# Patient Record
Sex: FEMALE | Race: BLACK OR AFRICAN AMERICAN | NOT HISPANIC OR LATINO | Employment: OTHER | URBAN - METROPOLITAN AREA
[De-identification: names, ages, dates, MRNs, and addresses within clinical notes are randomized per-mention and may not be internally consistent; named-entity substitution may affect disease eponyms.]

---

## 2018-02-27 ENCOUNTER — HOSPITAL ENCOUNTER (OUTPATIENT)
Dept: CT IMAGING | Facility: HOSPITAL | Age: 78
Discharge: HOME | End: 2018-02-27
Attending: FAMILY MEDICINE | Admitting: FAMILY MEDICINE

## 2018-02-27 ENCOUNTER — TRANSCRIBE ORDERS (OUTPATIENT)
Dept: ADMINISTRATIVE | Facility: HOSPITAL | Age: 78
End: 2018-02-27

## 2018-02-27 DIAGNOSIS — R04.2 HEMOPTYSIS: ICD-10-CM

## 2018-02-27 DIAGNOSIS — R04.2 HEMOPTYSIS: Primary | ICD-10-CM

## 2018-02-27 LAB — CREAT BLDA-MCNC: 0.8 MG/DL (ref 0.6–1.3)

## 2018-02-27 PROCEDURE — 82565 ASSAY OF CREATININE: CPT

## 2018-02-27 PROCEDURE — 71270 CT THORAX DX C-/C+: CPT

## 2018-02-27 PROCEDURE — 0 IOPAMIDOL 61 % SOLUTION: Performed by: FAMILY MEDICINE

## 2018-02-27 RX ADMIN — IOPAMIDOL 93 ML: 612 INJECTION, SOLUTION INTRAVENOUS at 14:30

## 2018-03-06 ENCOUNTER — OFFICE VISIT (OUTPATIENT)
Dept: PULMONOLOGY | Facility: CLINIC | Age: 78
End: 2018-03-06

## 2018-03-06 ENCOUNTER — TRANSCRIBE ORDERS (OUTPATIENT)
Dept: ADMINISTRATIVE | Facility: HOSPITAL | Age: 78
End: 2018-03-06

## 2018-03-06 ENCOUNTER — DOCUMENTATION (OUTPATIENT)
Dept: OTHER | Facility: HOSPITAL | Age: 78
End: 2018-03-06

## 2018-03-06 VITALS
HEART RATE: 87 BPM | BODY MASS INDEX: 32.57 KG/M2 | WEIGHT: 177 LBS | HEIGHT: 62 IN | RESPIRATION RATE: 18 BRPM | SYSTOLIC BLOOD PRESSURE: 122 MMHG | TEMPERATURE: 97.1 F | OXYGEN SATURATION: 95 % | DIASTOLIC BLOOD PRESSURE: 70 MMHG

## 2018-03-06 DIAGNOSIS — J47.1 BRONCHIECTASIS WITH ACUTE EXACERBATION (HCC): Primary | ICD-10-CM

## 2018-03-06 DIAGNOSIS — Z12.31 VISIT FOR SCREENING MAMMOGRAM: Primary | ICD-10-CM

## 2018-03-06 DIAGNOSIS — R91.1 LUNG NODULE: ICD-10-CM

## 2018-03-06 PROCEDURE — 99204 OFFICE O/P NEW MOD 45 MIN: CPT | Performed by: INTERNAL MEDICINE

## 2018-03-06 RX ORDER — ALBUTEROL SULFATE 90 UG/1
POWDER, METERED RESPIRATORY (INHALATION) AS NEEDED
Refills: 0 | COMMUNITY
Start: 2018-02-27

## 2018-03-06 RX ORDER — ASPIRIN 81 MG/1
81 TABLET ORAL DAILY
COMMUNITY

## 2018-03-06 RX ORDER — AZITHROMYCIN 250 MG/1
TABLET, FILM COATED ORAL
Refills: 0 | COMMUNITY
Start: 2018-03-01

## 2018-03-06 RX ORDER — HYDROCODONE BITARTRATE AND ACETAMINOPHEN 10; 325 MG/1; MG/1
TABLET ORAL AS NEEDED
Refills: 0 | COMMUNITY
Start: 2018-01-20

## 2018-03-06 RX ORDER — FOLIC ACID 1 MG/1
1 TABLET ORAL DAILY
COMMUNITY

## 2018-03-06 RX ORDER — METHYLPREDNISOLONE 4 MG/1
TABLET ORAL
Refills: 0 | COMMUNITY
Start: 2018-02-27 | End: 2018-06-29

## 2018-03-06 RX ORDER — OMEPRAZOLE 20 MG/1
20 CAPSULE, DELAYED RELEASE ORAL DAILY
COMMUNITY

## 2018-03-06 RX ORDER — BECLOMETHASONE DIPROPIONATE HFA 40 UG/1
AEROSOL, METERED RESPIRATORY (INHALATION) 2 TIMES DAILY
Refills: 0 | COMMUNITY
Start: 2018-02-27

## 2018-03-06 NOTE — PROGRESS NOTES
Met patient and family member in lung nodule clinic with . Patient recently had bronchitis and the flu. She was started on steroids, ABX and inhalers at that time and reports improvement. CT chest was ordered revealing 10 x 5mm RUL nodule. Scans and PFT's reviewed. Per  repeat CT chest x3mo with f/u OV. Introduced lung navigator role and provided contact information. She v/u and agreeable to plan. She knows to call with questions or concerns.

## 2018-03-06 NOTE — PROGRESS NOTES
"Sorts Subjective:     Chief Complaint:   Chief Complaint   Patient presents with   • Lung Nodule       HPI:    Katherine Marroquin is a 77 y.o. female seen in consultation at the request of Dr. Dank Farah for evaluation of a pulmonary nodule.    She developed symptoms of the \"flu\" about 3 weeks ago.  She had cough, congestion, and malaise.  She has been given antibiotics and her symptoms have improved though have not resolved.  She denies any systemic fevers currently.  She has no GI symptoms.    She describes her sputum as white but previously it was green.    She describes frequent episodes of bronchitis, usually every winter.    She saw Dr. Farah.  She was given an albuterol inhaler and nebulizer.  She was also given a Qvar inhaler.    A CT scan was performed.  This reveals bronchiectasis in the lower lobes.  There is bronchial wall thickening diffusely in the right upper lobe consistent with bronchial inflammation.  There was a described 10 x 5 mm ill-defined opacity associated with the bronchial wall likely inflammatory.  She is referred for evaluation of this abnormality.        Current medications are:   Current Outpatient Prescriptions:   •  aspirin 81 MG EC tablet, Take 81 mg by mouth Daily., Disp: , Rfl:   •  azithromycin (ZITHROMAX) 250 MG tablet, , Disp: , Rfl: 0  •  folic acid (FOLVITE) 1 MG tablet, Take 1 mg by mouth Daily., Disp: , Rfl:   •  HYDROcodone-acetaminophen (NORCO)  MG per tablet, As Needed., Disp: , Rfl: 0  •  MethylPREDNISolone (MEDROL, MARIA GUADALUPE,) 4 MG tablet, TK PO UTD, Disp: , Rfl: 0  •  omeprazole (priLOSEC) 20 MG capsule, Take 20 mg by mouth Daily., Disp: , Rfl:   •  PROAIR RESPICLICK 108 (90 Base) MCG/ACT inhaler, As Needed., Disp: , Rfl: 0  •  QVAR REDIHALER 40 MCG/ACT inhaler, 2 (Two) Times a Day., Disp: , Rfl: 0.      The patient's relevant past medical, surgical, family and social history were reviewed and updated in Epic as appropriate.     ROS:    Review of Systems  ROS " documented in patient questionnaire ×12 systems.  Reviewed with patient.  Otherwise negative except as noted in HPI.    Objective:    Physical Exam   Constitutional: She is oriented to person, place, and time. She appears well-developed and well-nourished.   HENT:   Head: Normocephalic and atraumatic.   Nose: Nose normal.   Mouth/Throat: Oropharynx is clear and moist. No oropharyngeal exudate.   Eyes: Conjunctivae are normal. No scleral icterus.   Neck: No tracheal deviation present. No thyromegaly present.   Cardiovascular: Normal rate and regular rhythm.  Exam reveals no gallop and no friction rub.    No murmur heard.  Pulmonary/Chest: Effort normal. No respiratory distress. She has no wheezes.   Few basilar crackles   Musculoskeletal: She exhibits no edema or deformity.   Neurological: She is alert and oriented to person, place, and time.   Skin: Skin is warm and dry. No rash noted.   Psychiatric: She has a normal mood and affect. Her behavior is normal.   Nursing note and vitals reviewed.      Diagnostics:     CT scan findings are as above.  Images were reviewed with the patient.    PFTs from the referring office were reviewed.  These were of suboptimal effort without clear obstruction.    Assessment:    Problem List Items Addressed This Visit        Pulmonary Problems    Bronchiectasis with acute exacerbation - Primary    Relevant Medications    PROAIR RESPICLICK 108 (90 Base) MCG/ACT inhaler    QVAR REDIHALER 40 MCG/ACT inhaler    Lung nodule    Overview     Right upper lobe associated with bronchial inflammation, likely inflammatory         Relevant Orders    CT Chest Without Contrast          She has a right upper lobe nodule associated with diffuse bronchial inflammation.  She has evidence of mild bronchiectasis elsewhere.  I am in agreement with the radiologist reported that this nodule is likely inflammatory in nature.  I think that a three-month follow-up scan to be safe would be reasonable.    Plan:      1. Three-month follow-up CAT scan though this lesion is likely inflammatory in nature  2. She does have some bronchiectasis and I think she will be at higher risk for recurrent bronchial and infections, which she has already experienced.  As needed antibiotics would be appropriate as you have done.  I think inhaled therapy may be appropriate acutely but would not continue this long-term on a scheduled basis unless there are persistent symptoms.  She could probably use bronchodilators on an as-needed basis when she returns to baseline.  3. I will have her return for at least one more follow-up after her CAT scan is complete.  Further follow-up will be dependent upon whether further imaging studies are needed.      Signed by  Satish Cardoza MD

## 2018-03-29 ENCOUNTER — APPOINTMENT (OUTPATIENT)
Dept: MAMMOGRAPHY | Facility: HOSPITAL | Age: 78
End: 2018-03-29
Attending: FAMILY MEDICINE

## 2018-04-09 ENCOUNTER — HOSPITAL ENCOUNTER (OUTPATIENT)
Dept: GENERAL RADIOLOGY | Facility: HOSPITAL | Age: 78
Discharge: HOME | End: 2018-04-09
Attending: FAMILY MEDICINE | Admitting: FAMILY MEDICINE

## 2018-04-09 ENCOUNTER — TRANSCRIBE ORDERS (OUTPATIENT)
Dept: ADMINISTRATIVE | Facility: HOSPITAL | Age: 78
End: 2018-04-09

## 2018-04-09 DIAGNOSIS — R07.9 CHEST PAIN, UNSPECIFIED TYPE: Primary | ICD-10-CM

## 2018-04-09 DIAGNOSIS — R07.9 CHEST PAIN, UNSPECIFIED TYPE: ICD-10-CM

## 2018-04-09 PROCEDURE — 71046 X-RAY EXAM CHEST 2 VIEWS: CPT

## 2018-04-10 ENCOUNTER — TRANSCRIBE ORDERS (OUTPATIENT)
Dept: ADMINISTRATIVE | Facility: HOSPITAL | Age: 78
End: 2018-04-10

## 2018-04-10 DIAGNOSIS — R07.9 CHEST PAIN, UNSPECIFIED TYPE: Primary | ICD-10-CM

## 2018-04-17 ENCOUNTER — APPOINTMENT (OUTPATIENT)
Dept: MAMMOGRAPHY | Facility: HOSPITAL | Age: 78
End: 2018-04-17
Attending: FAMILY MEDICINE

## 2018-04-19 ENCOUNTER — APPOINTMENT (OUTPATIENT)
Dept: CARDIOLOGY | Facility: HOSPITAL | Age: 78
End: 2018-04-19
Attending: FAMILY MEDICINE

## 2018-04-30 ENCOUNTER — HOSPITAL ENCOUNTER (OUTPATIENT)
Dept: CARDIOLOGY | Facility: HOSPITAL | Age: 78
Discharge: HOME | End: 2018-04-30
Attending: FAMILY MEDICINE

## 2018-04-30 DIAGNOSIS — R07.9 CHEST PAIN, UNSPECIFIED TYPE: ICD-10-CM

## 2018-04-30 LAB
BH CV STRESS BP STAGE 2: NORMAL
BH CV STRESS BP STAGE 4: NORMAL
BH CV STRESS COMMENTS STAGE 1: NORMAL
BH CV STRESS DOSE REGADENOSON STAGE 1: 0.4
BH CV STRESS DURATION MIN STAGE 1: 0
BH CV STRESS DURATION MIN STAGE 2: 1
BH CV STRESS DURATION MIN STAGE 3: 1
BH CV STRESS DURATION MIN STAGE 4: 1
BH CV STRESS DURATION SEC STAGE 1: 10
BH CV STRESS DURATION SEC STAGE 2: 0
BH CV STRESS HR STAGE 1: 141
BH CV STRESS HR STAGE 2: 125
BH CV STRESS HR STAGE 3: 123
BH CV STRESS HR STAGE 4: 112
BH CV STRESS PROTOCOL 1: NORMAL
BH CV STRESS RECOVERY BP: NORMAL MMHG
BH CV STRESS RECOVERY HR: 103 BPM
BH CV STRESS STAGE 1: 1
BH CV STRESS STAGE 2: 2
BH CV STRESS STAGE 3: 3
BH CV STRESS STAGE 4: 4
LV EF NUC BP: 78 %
MAXIMAL PREDICTED HEART RATE: 141 BPM
PERCENT MAX PREDICTED HR: 100 %
STRESS BASELINE BP: NORMAL MMHG
STRESS BASELINE HR: 54 BPM
STRESS PERCENT HR: 118 %
STRESS POST PEAK BP: NORMAL MMHG
STRESS POST PEAK HR: 141 BPM
STRESS TARGET HR: 120 BPM

## 2018-04-30 PROCEDURE — 93017 CV STRESS TEST TRACING ONLY: CPT

## 2018-04-30 PROCEDURE — 93018 CV STRESS TEST I&R ONLY: CPT | Performed by: INTERNAL MEDICINE

## 2018-04-30 PROCEDURE — 78452 HT MUSCLE IMAGE SPECT MULT: CPT

## 2018-04-30 PROCEDURE — 0 TECHNETIUM SESTAMIBI

## 2018-04-30 PROCEDURE — 25010000002 REGADENOSON 0.4 MG/5ML SOLUTION: Performed by: FAMILY MEDICINE

## 2018-04-30 PROCEDURE — 78452 HT MUSCLE IMAGE SPECT MULT: CPT | Performed by: INTERNAL MEDICINE

## 2018-04-30 PROCEDURE — A9500 TC99M SESTAMIBI: HCPCS

## 2018-04-30 RX ADMIN — REGADENOSON 0.4 MG: 0.08 INJECTION, SOLUTION INTRAVENOUS at 10:50

## 2018-06-01 ENCOUNTER — HOSPITAL ENCOUNTER (OUTPATIENT)
Dept: CT IMAGING | Facility: HOSPITAL | Age: 78
Discharge: HOME | End: 2018-06-01
Attending: INTERNAL MEDICINE | Admitting: INTERNAL MEDICINE

## 2018-06-01 DIAGNOSIS — R91.1 LUNG NODULE: ICD-10-CM

## 2018-06-01 PROCEDURE — 71250 CT THORAX DX C-: CPT

## 2018-06-20 ENCOUNTER — APPOINTMENT (OUTPATIENT)
Dept: MAMMOGRAPHY | Facility: HOSPITAL | Age: 78
End: 2018-06-20
Attending: FAMILY MEDICINE

## 2018-06-29 ENCOUNTER — OFFICE VISIT (OUTPATIENT)
Dept: PULMONOLOGY | Facility: CLINIC | Age: 78
End: 2018-06-29

## 2018-06-29 VITALS
OXYGEN SATURATION: 96 % | HEIGHT: 62 IN | HEART RATE: 88 BPM | TEMPERATURE: 98.6 F | WEIGHT: 168 LBS | BODY MASS INDEX: 30.91 KG/M2 | SYSTOLIC BLOOD PRESSURE: 140 MMHG | DIASTOLIC BLOOD PRESSURE: 82 MMHG

## 2018-06-29 DIAGNOSIS — R91.1 LUNG NODULE: ICD-10-CM

## 2018-06-29 DIAGNOSIS — J47.9 BRONCHIECTASIS WITHOUT COMPLICATION (HCC): Primary | ICD-10-CM

## 2018-06-29 PROCEDURE — 99214 OFFICE O/P EST MOD 30 MIN: CPT | Performed by: INTERNAL MEDICINE

## 2018-06-29 RX ORDER — PREDNISONE 2.5 MG
2.5 TABLET ORAL DAILY
COMMUNITY

## 2018-06-29 NOTE — PROGRESS NOTES
Subjective:     Chief Complaint:   Chief Complaint   Patient presents with   • Recurrent Pneumonia       HPI:    Katherine Marroquin is a 78 y.o. female here for follow-up of Abnormal CAT scan.    I saw her in consultation in March for an ill-defined nodule in the right upper lobe.  I felt this was related to postinflammatory changes.  There was mild bronchiectasis present.  A follow-up CAT scan was recommended and was performed on 6/1 which revealed a nodular density to be less prominent.  The remainder of the findings appeared chronic.    Current symptoms:    General symptoms:  - no fever  - weight loss of 9 lbs    Exercise tolerance:  - dyspnea with moderate exertion    Chest symptoms:  - no chest pain  - mild cough    Sputum:  - typically expectorates yellow sputum  - denies hemoptysis    Upper airway:  - no sinus congestion or drainage    Reflux symptoms:  - reflux symptoms controlled with PPI    Recent imaging:  - CT of chest:  Reviewed 6/1/18    Current medications are:   Current Outpatient Prescriptions:   •  aspirin 81 MG EC tablet, Take 81 mg by mouth Daily., Disp: , Rfl:   •  folic acid (FOLVITE) 1 MG tablet, Take 1 mg by mouth Daily., Disp: , Rfl:   •  HYDROcodone-acetaminophen (NORCO)  MG per tablet, As Needed., Disp: , Rfl: 0  •  omeprazole (priLOSEC) 20 MG capsule, Take 20 mg by mouth Daily., Disp: , Rfl:   •  predniSONE (DELTASONE) 2.5 MG tablet, Take 2.5 mg by mouth Daily., Disp: , Rfl:   •  PROAIR RESPICLICK 108 (90 Base) MCG/ACT inhaler, As Needed., Disp: , Rfl: 0  •  QVAR REDIHALER 40 MCG/ACT inhaler, 2 (Two) Times a Day., Disp: , Rfl: 0  •  azithromycin (ZITHROMAX) 250 MG tablet, , Disp: , Rfl: 0.      The patient's relevant past medical, surgical, family and social history were reviewed and updated in Epic as appropriate.     ROS:    Review of Systems   Constitutional: Negative for fever.   HENT: Negative for congestion.    Respiratory: Positive for cough.          Objective:    Physical Exam    Constitutional: She is oriented to person, place, and time. She appears well-developed and well-nourished.   HENT:   Head: Normocephalic and atraumatic.   Mouth/Throat: Oropharynx is clear and moist.   Neck: Neck supple. No thyromegaly present.   Cardiovascular: Normal rate and regular rhythm.  Exam reveals no gallop and no friction rub.    No murmur heard.  Pulmonary/Chest: Effort normal. No respiratory distress. She has no wheezes. She has no rales.   Musculoskeletal: She exhibits no edema.   Trace edema   Neurological: She is alert and oriented to person, place, and time.   Skin: Skin is warm and dry.   Psychiatric: She has a normal mood and affect. Her behavior is normal.   Vitals reviewed.      Diagnostics:    CT scan reviewed.  Chronic postinflammatory abnormalities with improvement in the previously described nodular density.    Assessment:    Problem List Items Addressed This Visit        Pulmonary Problems    Bronchiectasis without complication - Primary    Lung nodule    Overview     Right upper lobe associated with bronchial inflammation, likely inflammatory, improved               I went over the results of the scan with the patient and her daughter.  I think the findings are postinflammatory and are described as improved.  I don't see the need for continued longitudinal serial CAT scans in her case.  I told her that she may be at increased risk for bronchitis or lower respiratory tract infections and she could use appropriate antibiotics when needed.    Plan:     I will see her on an as-needed basis.  I don't see any need for chronic medications or inhaled therapy.  She will follow with Dr. Farah as before.    Discussed in detail with the patient.  She will call prior to her follow up visit for any new problems.    Signed by  Satish Cardoza MD

## 2018-07-03 ENCOUNTER — HOSPITAL ENCOUNTER (OUTPATIENT)
Dept: MAMMOGRAPHY | Facility: HOSPITAL | Age: 78
Discharge: HOME | End: 2018-07-03
Attending: FAMILY MEDICINE | Admitting: FAMILY MEDICINE

## 2018-07-03 DIAGNOSIS — Z12.31 VISIT FOR SCREENING MAMMOGRAM: ICD-10-CM

## 2018-07-03 PROCEDURE — 77063 BREAST TOMOSYNTHESIS BI: CPT | Performed by: RADIOLOGY

## 2018-07-03 PROCEDURE — 77067 SCR MAMMO BI INCL CAD: CPT | Performed by: RADIOLOGY

## 2018-07-03 PROCEDURE — 77067 SCR MAMMO BI INCL CAD: CPT

## 2018-07-03 PROCEDURE — 77063 BREAST TOMOSYNTHESIS BI: CPT

## 2018-08-10 ENCOUNTER — HOSPITAL ENCOUNTER (OUTPATIENT)
Dept: GENERAL RADIOLOGY | Facility: HOSPITAL | Age: 78
Discharge: HOME | End: 2018-08-10
Admitting: NURSE PRACTITIONER

## 2018-08-10 ENCOUNTER — TRANSCRIBE ORDERS (OUTPATIENT)
Dept: ADMINISTRATIVE | Facility: HOSPITAL | Age: 78
End: 2018-08-10

## 2018-08-10 DIAGNOSIS — R52 PAIN: Primary | ICD-10-CM

## 2018-08-10 DIAGNOSIS — R60.9 SWELLING: ICD-10-CM

## 2018-08-10 PROCEDURE — 73070 X-RAY EXAM OF ELBOW: CPT

## 2019-02-06 ENCOUNTER — HOSPITAL ENCOUNTER (OUTPATIENT)
Dept: GENERAL RADIOLOGY | Facility: HOSPITAL | Age: 79
Discharge: HOME | End: 2019-02-06
Admitting: FAMILY MEDICINE

## 2019-02-06 ENCOUNTER — TRANSCRIBE ORDERS (OUTPATIENT)
Dept: ADMINISTRATIVE | Facility: HOSPITAL | Age: 79
End: 2019-02-06

## 2019-02-06 DIAGNOSIS — J18.1 LOBAR PNEUMONIA, UNSPECIFIED ORGANISM (HCC): Primary | ICD-10-CM

## 2019-02-06 PROCEDURE — 71046 X-RAY EXAM CHEST 2 VIEWS: CPT

## 2022-05-24 ENCOUNTER — HOSPITAL ENCOUNTER (OUTPATIENT)
Dept: GENERAL RADIOLOGY | Facility: HOSPITAL | Age: 82
Discharge: HOME | End: 2022-05-24
Admitting: FAMILY MEDICINE

## 2022-05-24 ENCOUNTER — TRANSCRIBE ORDERS (OUTPATIENT)
Dept: ADMINISTRATIVE | Facility: HOSPITAL | Age: 82
End: 2022-05-24

## 2022-05-24 DIAGNOSIS — M25.552 BILATERAL HIP PAIN: Primary | ICD-10-CM

## 2022-05-24 DIAGNOSIS — M25.552 BILATERAL HIP PAIN: ICD-10-CM

## 2022-05-24 DIAGNOSIS — G89.4 CHRONIC PAIN SYNDROME: ICD-10-CM

## 2022-05-24 DIAGNOSIS — M25.551 BILATERAL HIP PAIN: ICD-10-CM

## 2022-05-24 DIAGNOSIS — M25.551 BILATERAL HIP PAIN: Primary | ICD-10-CM

## 2022-05-24 PROCEDURE — 72114 X-RAY EXAM L-S SPINE BENDING: CPT

## 2022-05-24 PROCEDURE — 73522 X-RAY EXAM HIPS BI 3-4 VIEWS: CPT

## 2022-06-08 ENCOUNTER — TRANSCRIBE ORDERS (OUTPATIENT)
Dept: ADMINISTRATIVE | Facility: HOSPITAL | Age: 82
End: 2022-06-08

## 2022-06-08 ENCOUNTER — HOSPITAL ENCOUNTER (OUTPATIENT)
Dept: GENERAL RADIOLOGY | Facility: HOSPITAL | Age: 82
Discharge: HOME | End: 2022-06-08
Admitting: NURSE PRACTITIONER

## 2022-06-08 DIAGNOSIS — R07.1 INSPIRATORY PAIN: Primary | ICD-10-CM

## 2022-06-08 DIAGNOSIS — R07.1 INSPIRATORY PAIN: ICD-10-CM

## 2022-06-08 PROCEDURE — 71110 X-RAY EXAM RIBS BIL 3 VIEWS: CPT

## 2022-06-08 PROCEDURE — 71046 X-RAY EXAM CHEST 2 VIEWS: CPT

## 2023-05-26 ENCOUNTER — TRANSCRIBE ORDERS (OUTPATIENT)
Dept: ADMINISTRATIVE | Facility: HOSPITAL | Age: 83
End: 2023-05-26

## 2023-05-26 ENCOUNTER — HOSPITAL ENCOUNTER (OUTPATIENT)
Dept: GENERAL RADIOLOGY | Facility: HOSPITAL | Age: 83
Discharge: HOME | End: 2023-05-26
Payer: MEDICARE

## 2023-05-26 DIAGNOSIS — L28.0 LICHEN SIMPLEX CHRONICUS: Primary | ICD-10-CM

## 2023-05-26 DIAGNOSIS — L29.9 PRURITUS: ICD-10-CM

## 2023-05-26 PROCEDURE — 71046 X-RAY EXAM CHEST 2 VIEWS: CPT

## 2024-06-04 PROBLEM — Z79.899 IMMUNODEFICIENCY DUE TO TREATMENT WITH IMMUNOSUPPRESSIVE MEDICATION: Status: RESOLVED | Noted: 2024-06-04 | Resolved: 2024-06-04

## 2024-06-04 PROBLEM — T45.1X5A IMMUNODEFICIENCY DUE TO TREATMENT WITH IMMUNOSUPPRESSIVE MEDICATION: Status: RESOLVED | Noted: 2024-06-04 | Resolved: 2024-06-04

## 2024-06-04 PROBLEM — Z79.899 IMMUNODEFICIENCY DUE TO TREATMENT WITH IMMUNOSUPPRESSIVE MEDICATION: Status: ACTIVE | Noted: 2024-06-04

## 2024-06-04 PROBLEM — M15.9 GENERALIZED OSTEOARTHRITIS: Status: ACTIVE | Noted: 2024-06-04

## 2024-06-04 PROBLEM — Z79.60 IMMUNODEFICIENCY DUE TO TREATMENT WITH IMMUNOSUPPRESSIVE MEDICATION: Status: RESOLVED | Noted: 2024-06-04 | Resolved: 2024-06-04

## 2024-06-04 PROBLEM — Z96.653 PRESENCE OF BOTH ARTIFICIAL KNEE JOINTS: Status: ACTIVE | Noted: 2024-06-04

## 2024-06-04 PROBLEM — M81.0 OSTEOPOROSIS: Status: ACTIVE | Noted: 2024-06-04

## 2024-06-04 PROBLEM — D84.821 IMMUNODEFICIENCY DUE TO TREATMENT WITH IMMUNOSUPPRESSIVE MEDICATION: Status: ACTIVE | Noted: 2024-06-04

## 2024-06-04 PROBLEM — M19.021 ARTHRITIS OF RIGHT ELBOW: Status: RESOLVED | Noted: 2024-06-04 | Resolved: 2024-06-04

## 2024-06-04 PROBLEM — T45.1X5A IMMUNODEFICIENCY DUE TO TREATMENT WITH IMMUNOSUPPRESSIVE MEDICATION: Status: ACTIVE | Noted: 2024-06-04

## 2024-06-04 PROBLEM — M05.79 RHEUMATOID ARTHRITIS INVOLVING MULTIPLE SITES WITH POSITIVE RHEUMATOID FACTOR: Status: ACTIVE | Noted: 2024-06-04

## 2024-06-04 PROBLEM — D84.821 IMMUNODEFICIENCY DUE TO TREATMENT WITH IMMUNOSUPPRESSIVE MEDICATION: Status: RESOLVED | Noted: 2024-06-04 | Resolved: 2024-06-04

## 2024-06-04 PROBLEM — Z79.60 IMMUNODEFICIENCY DUE TO TREATMENT WITH IMMUNOSUPPRESSIVE MEDICATION: Status: ACTIVE | Noted: 2024-06-04

## 2024-06-04 PROBLEM — Z79.899 ENCOUNTER FOR LONG-TERM (CURRENT) USE OF HIGH-RISK MEDICATION: Status: ACTIVE | Noted: 2024-06-04

## 2024-06-04 PROBLEM — M19.021 ARTHRITIS OF RIGHT ELBOW: Status: ACTIVE | Noted: 2024-06-04

## 2024-06-06 ENCOUNTER — OFFICE VISIT (OUTPATIENT)
Age: 84
End: 2024-06-06
Payer: MEDICARE

## 2024-06-06 ENCOUNTER — LAB (OUTPATIENT)
Facility: HOSPITAL | Age: 84
End: 2024-06-06
Payer: MEDICARE

## 2024-06-06 VITALS
WEIGHT: 157.2 LBS | HEART RATE: 67 BPM | HEIGHT: 65 IN | DIASTOLIC BLOOD PRESSURE: 74 MMHG | TEMPERATURE: 97.2 F | BODY MASS INDEX: 26.19 KG/M2 | SYSTOLIC BLOOD PRESSURE: 150 MMHG

## 2024-06-06 DIAGNOSIS — Z79.899 ENCOUNTER FOR LONG-TERM (CURRENT) USE OF HIGH-RISK MEDICATION: ICD-10-CM

## 2024-06-06 DIAGNOSIS — M81.0 AGE-RELATED OSTEOPOROSIS WITHOUT CURRENT PATHOLOGICAL FRACTURE: ICD-10-CM

## 2024-06-06 DIAGNOSIS — M05.79 RHEUMATOID ARTHRITIS INVOLVING MULTIPLE SITES WITH POSITIVE RHEUMATOID FACTOR: Primary | ICD-10-CM

## 2024-06-06 DIAGNOSIS — M05.79 RHEUMATOID ARTHRITIS INVOLVING MULTIPLE SITES WITH POSITIVE RHEUMATOID FACTOR: ICD-10-CM

## 2024-06-06 DIAGNOSIS — Z96.653 PRESENCE OF BOTH ARTIFICIAL KNEE JOINTS: ICD-10-CM

## 2024-06-06 DIAGNOSIS — M15.9 GENERALIZED OSTEOARTHRITIS: ICD-10-CM

## 2024-06-06 LAB
ALBUMIN SERPL-MCNC: 3.9 G/DL (ref 3.5–5.2)
ALBUMIN/GLOB SERPL: 1.1 G/DL
ALP SERPL-CCNC: 92 U/L (ref 39–117)
ALT SERPL W P-5'-P-CCNC: 14 U/L (ref 1–33)
ANION GAP SERPL CALCULATED.3IONS-SCNC: 9.9 MMOL/L (ref 5–15)
AST SERPL-CCNC: 20 U/L (ref 1–32)
BASOPHILS # BLD MANUAL: 0.12 10*3/MM3 (ref 0–0.2)
BASOPHILS NFR BLD MANUAL: 2 % (ref 0–1.5)
BILIRUB SERPL-MCNC: 0.3 MG/DL (ref 0–1.2)
BUN SERPL-MCNC: 11 MG/DL (ref 8–23)
BUN/CREAT SERPL: 16.7 (ref 7–25)
CALCIUM SPEC-SCNC: 9.1 MG/DL (ref 8.6–10.5)
CHLORIDE SERPL-SCNC: 103 MMOL/L (ref 98–107)
CO2 SERPL-SCNC: 23.1 MMOL/L (ref 22–29)
CREAT SERPL-MCNC: 0.66 MG/DL (ref 0.57–1)
CRP SERPL-MCNC: 1.76 MG/DL (ref 0–0.5)
DEPRECATED RDW RBC AUTO: 42 FL (ref 37–54)
EGFRCR SERPLBLD CKD-EPI 2021: 85.6 ML/MIN/1.73
EOSINOPHIL # BLD MANUAL: 0.12 10*3/MM3 (ref 0–0.4)
EOSINOPHIL NFR BLD MANUAL: 2 % (ref 0.3–6.2)
ERYTHROCYTE [DISTWIDTH] IN BLOOD BY AUTOMATED COUNT: 12.8 % (ref 12.3–15.4)
ERYTHROCYTE [SEDIMENTATION RATE] IN BLOOD: 55 MM/HR (ref 0–30)
GLOBULIN UR ELPH-MCNC: 3.6 GM/DL
GLUCOSE SERPL-MCNC: 88 MG/DL (ref 65–99)
HCT VFR BLD AUTO: 36.7 % (ref 34–46.6)
HGB BLD-MCNC: 12.1 G/DL (ref 12–15.9)
LYMPHOCYTES # BLD MANUAL: 1.77 10*3/MM3 (ref 0.7–3.1)
LYMPHOCYTES NFR BLD MANUAL: 8 % (ref 5–12)
MCH RBC QN AUTO: 30 PG (ref 26.6–33)
MCHC RBC AUTO-ENTMCNC: 33 G/DL (ref 31.5–35.7)
MCV RBC AUTO: 90.8 FL (ref 79–97)
MONOCYTES # BLD: 0.47 10*3/MM3 (ref 0.1–0.9)
NEUTROPHILS # BLD AUTO: 3.43 10*3/MM3 (ref 1.7–7)
NEUTROPHILS NFR BLD MANUAL: 58 % (ref 42.7–76)
PLAT MORPH BLD: NORMAL
PLATELET # BLD AUTO: 218 10*3/MM3 (ref 140–450)
PMV BLD AUTO: 10.5 FL (ref 6–12)
POLYCHROMASIA BLD QL SMEAR: ABNORMAL
POTASSIUM SERPL-SCNC: 3.8 MMOL/L (ref 3.5–5.2)
PROT SERPL-MCNC: 7.5 G/DL (ref 6–8.5)
RBC # BLD AUTO: 4.04 10*6/MM3 (ref 3.77–5.28)
SODIUM SERPL-SCNC: 136 MMOL/L (ref 136–145)
VARIANT LYMPHS NFR BLD MANUAL: 30 % (ref 19.6–45.3)
WBC MORPH BLD: NORMAL
WBC NRBC COR # BLD AUTO: 5.91 10*3/MM3 (ref 3.4–10.8)

## 2024-06-06 PROCEDURE — 80053 COMPREHEN METABOLIC PANEL: CPT

## 2024-06-06 PROCEDURE — 85007 BL SMEAR W/DIFF WBC COUNT: CPT

## 2024-06-06 PROCEDURE — 85652 RBC SED RATE AUTOMATED: CPT

## 2024-06-06 PROCEDURE — 85027 COMPLETE CBC AUTOMATED: CPT

## 2024-06-06 PROCEDURE — 36415 COLL VENOUS BLD VENIPUNCTURE: CPT

## 2024-06-06 PROCEDURE — 86140 C-REACTIVE PROTEIN: CPT

## 2024-06-06 RX ORDER — FOLIC ACID 1 MG/1
1 TABLET ORAL DAILY
Qty: 90 TABLET | Refills: 1 | Status: SHIPPED | OUTPATIENT
Start: 2024-06-06

## 2024-06-06 RX ORDER — GABAPENTIN 100 MG/1
100 CAPSULE ORAL 3 TIMES DAILY
COMMUNITY
Start: 2024-05-15

## 2024-06-06 RX ORDER — ALENDRONATE SODIUM 35 MG/1
35 TABLET ORAL
COMMUNITY
End: 2024-06-06

## 2024-06-06 RX ORDER — METHOTREXATE 2.5 MG/1
20 TABLET ORAL WEEKLY
Qty: 96 TABLET | Refills: 0 | Status: SHIPPED | OUTPATIENT
Start: 2024-06-06

## 2024-06-06 RX ORDER — ALENDRONATE SODIUM 70 MG/1
70 TABLET ORAL
Qty: 4 TABLET | Refills: 5 | Status: SHIPPED | OUTPATIENT
Start: 2024-06-06

## 2024-06-06 NOTE — ASSESSMENT & PLAN NOTE
Methotrexate    Well tolerated and effective   We discussed risks of MTX. Risks include but are not limited to severe liver damage that can be fatal, the possible need for liver biopsy, bone marrow suppression that can lead to dangerously low blood counts, GI side effects including mouth sores and diarrhea, fatigue, and rare risk of severe pulmonary complications. There should be no alcohol consumed with MTX. MTX can cause severe fetal abnormalities whether the mother or father is taking the medication and thus must be avoided if pregnancy is a possibility.  All medication is to be taken one day a week only. The need for q 8 week labs and the need for folic acid supplementation were discussed.

## 2024-06-06 NOTE — ASSESSMENT & PLAN NOTE
DEXA scan July 2022 with PCP-worsening osteoporosis  DEXA scan 7/6/20 with PCP:  T-score -2.3 left femoral neck (worse by 6%), T-score lumbar spine -2.3  Current:  Alendronate 2020   Prolia PA 8/22-never started due to patient concern about cost for 300 dollars per injection    Referred by her PCP for management of osteoporosis- worsening on latest bone density scan done with her PCP in July 2022 reviewed with patient  She admits she did not previously take Fosamax faithfully.  We started Prolia process back in 8/2022, but never started Prolia due to patient concern about cost of 300 dollars per injection    Recommend continue alendronate once weekly.  Refilled  Recommend continue calcium, vitamin-D, and weight-bearing exercise.  Update bone density scan when we are back at [place].

## 2024-06-06 NOTE — ASSESSMENT & PLAN NOTE
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 Chambers 2020 [facility] rheumatology  *Current:  MTX restart 2/21(prior 2020 Dr Chambers), intermittent elbow steroid injection  Prior Plaquenil 9/20-7/23 (? itching/rash deep pigmentation shins), Celebrex (stopped by [facility] dermatology for ? drug rx causing itching legs/rash shins)  inflectra PA May 2021-she declined to start    Low disease activity from RA standpoint on methotrexate  Her main complaint is chronic left elbow pain which is from advanced degenerative osteoarthritis   She has debated on whether to have her elbow replaced with Dr. Aldridge, but is reluctant to proceed with surgery at her age.  Continue methotrexate which is well tolerated and effective. Her daughter state she does not take this regularly.  Instructed patient to take this regularly to help with RA and RA pain.  Lower extremity depigmentation/rash better with stopping hydroxychloroquine/celecoxib.  She still has itching that resolves with topical creams.  Dermatologist stopped Celebrex with the thought of possible drug reaction to the celecoxib.  She can not tell much difference without the celecoxib  She previously chose not to start infliximab/ Inflectra when offered in 7/2021  [facility].  Sed rate elevated at 75 but this is chronic for her RF IgA 15, Orovite GG 12, RF IgM greater than 100.  All of these are elevated.  Continue labs every 8-12 weeks while on methotrexate. Order provided for labs today  Follow-up in 3 months.  Her daughter accompanies her today

## 2024-06-06 NOTE — ASSESSMENT & PLAN NOTE
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 If no fractures are present then I recommend physical therapy/water therapy.  If there are fractures then she will need to see orthopedics.    Her daughter asks about pain medications such as hydrocodone.  I explained we typically do not prescribe these medications but she can ask her PCP.  I explained that I would not be comfortable writing for a narcotic as she is taking gabapentin and has had a recent fall.  It is okay for her to use the CBD cream.  I did discuss with the daughter and patient that CBD can show as THC in urine drug screen.    She has been started on gabapentin for leg pain that is suspected to be nerve related.    She has stop celecoxib as this was thought to perhaps cause itching/skin lesions lower extremities by her dermatologist at [facility].

## 2024-06-06 NOTE — PROGRESS NOTES
Office Visit       Date: 06/06/2024   Patient Name: Katherine Marroquin  MRN: [id number]  YOB: 1940    Referring Physician: Etelvina Sung*     Chief Complaint:   Chief Complaint   Patient presents with    Rheumatoid Arthritis    Arthritis       History of Present Illness: Katherine Marroquin is a 84 y.o. female who is here today for follow-up of rheumatoid arthritis.  She reports doing worse.  She rates her pain 10 out of 10 and has 5 minutes of morning stiffness.  She has started on gabapentin by an outside provider for was believed to be nerve pain in her legs.  She needs refills of all medications.  She fell one month ago.  She laid there for an hour.  Two men helped her up.  She has had worsening shoulder pain and knee pain since the fall.    Her daughter is wondering if she should try CBD cream on her painful joints.  She did not complete the RAPID3 for review of symptoms today    Subjective   Review of Systems:      Past Medical History:   Past Medical History:   Diagnosis Date    Anxiety     Breast cancer 1979    RIGHT    Elevated lipids     Encounter for long-term (current) use of high-risk medication 06/04/2024    GERD (gastroesophageal reflux disease)     Hyperlipidemia     Insomnia     Obesity     Oral herpes     Osteoarthritis, generalized     Osteopenia     Osteoporosis     RA (rheumatoid arthritis)     Seropositive rheumatoid arthritis     Stomach ulcer        Past Surgical History:   Past Surgical History:   Procedure Laterality Date    BREAST BIOPSY Bilateral     DENTAL PROCEDURE  1942    MASTECTOMY  1979    RIGHT    OOPHORECTOMY Bilateral 1969    TOTAL ABDOMINAL HYSTERECTOMY  1969    TOTAL ABDOMINAL HYSTERECTOMY      TOTAL KNEE ARTHROPLASTY  2009    left    TOTAL KNEE ARTHROPLASTY  2011    right       Family History:   Family History   Problem Relation Age of Onset    Stroke Mother     Heart failure Father     Stroke Maternal Grandfather     Diabetes Daughter     Mental  illness Daughter     Heart attack Maternal Aunt     Colon cancer Grandchild     Breast cancer Neg Hx     Ovarian cancer Neg Hx        Social History:   Social History     Socioeconomic History    Marital status:    Tobacco Use    Smoking status: Never    Smokeless tobacco: Never   Vaping Use    Vaping status: Never Used   Substance and Sexual Activity    Alcohol use: No    Drug use: No    Sexual activity: Defer       Medications:   Current Outpatient Medications:     aspirin 81 MG EC tablet, Take 1 tablet by mouth Daily., Disp: , Rfl:     Diclofenac Sodium (VOLTAREN) 1 % gel gel, Apply 4 g topically to the appropriate area as directed 4 (Four) Times a Day As Needed (joint pain)., Disp: 100 g, Rfl: 5    famotidine (PEPCID) 20 MG tablet, Take 1 tablet by mouth 2 (Two) Times a Day., Disp: , Rfl:     folic acid (FOLVITE) 1 MG tablet, Take 1 tablet by mouth Daily., Disp: 90 tablet, Rfl: 1    gabapentin (NEURONTIN) 100 MG capsule, Take 1 capsule by mouth 3 (Three) Times a Day., Disp: , Rfl:     hydrOXYzine (ATARAX) 25 MG tablet, Take 1 tablet by mouth Daily As Needed for Itching., Disp: , Rfl:     methotrexate 2.5 MG tablet, Take 8 tablets by mouth 1 (One) Time Per Week. SAME DAY EACH WK, Disp: 96 tablet, Rfl: 0    omeprazole (priLOSEC) 20 MG capsule, Take 1 capsule by mouth Daily., Disp: , Rfl:     PROAIR RESPICLICK 108 (90 Base) MCG/ACT inhaler, As Needed., Disp: , Rfl: 0    QVAR REDIHALER 40 MCG/ACT inhaler, 2 (Two) Times a Day., Disp: , Rfl: 0    rosuvastatin (CRESTOR) 20 MG tablet, Take 1 tablet by mouth Daily., Disp: , Rfl:     VITAMIN D, ERGOCALCIFEROL, PO, Take 1 capsule by mouth 1 (One) Time Per Week. FOR 12 WEEKS AS DIRECTED, Disp: , Rfl:     alendronate (FOSAMAX) 70 MG tablet, Take 1 tablet by mouth Every 7 (Seven) Days., Disp: 4 tablet, Rfl: 5    azithromycin (ZITHROMAX) 250 MG tablet, , Disp: , Rfl: 0    HYDROcodone-acetaminophen (NORCO)  MG per tablet, As Needed. (Patient not taking: Reported on  "6/6/2024), Disp: , Rfl: 0    pantoprazole (PROTONIX) 40 MG EC tablet, Take 1 tablet by mouth Daily. (Patient not taking: Reported on 6/6/2024), Disp: , Rfl:     predniSONE (DELTASONE) 2.5 MG tablet, Take 2.5 mg by mouth Daily. (Patient not taking: Reported on 6/6/2024), Disp: , Rfl:     Allergies:   Allergies   Allergen Reactions    Penicillins Hives       I have reviewed and updated the patient's chief complaint, history of present illness, review of systems, past medical history, surgical history, family history, social history, medications and allergy list as appropriate.     Objective    Vital Signs:   Vitals:    06/06/24 1002   BP: 150/74   BP Location: Left arm   Patient Position: Sitting   Cuff Size: Adult   Pulse: 67   Temp: 97.2 °F (36.2 °C)   TempSrc: Temporal   Weight: 71.3 kg (157 lb 3.2 oz)   Height: 165.1 cm (65\")   PainSc: 10-Worst pain ever   PainLoc: Generalized     Body mass index is 26.16 kg/m².          Physical Exam:  Physical Exam  Vitals reviewed.   Constitutional:       Appearance: Normal appearance.   HENT:      Head: Normocephalic and atraumatic.      Mouth/Throat:      Mouth: Mucous membranes are moist.   Eyes:      Conjunctiva/sclera: Conjunctivae normal.   Cardiovascular:      Rate and Rhythm: Normal rate and regular rhythm.      Pulses: Normal pulses.      Heart sounds: Normal heart sounds.   Pulmonary:      Effort: Pulmonary effort is normal.      Breath sounds: Normal breath sounds.   Musculoskeletal:         General: Normal range of motion.      Cervical back: Normal range of motion and neck supple.      Comments: Ambulating with walker.  Pain in left upper jaw with opening wide.  Squaring bilateral CMC joints.  Tender shoulders bilaterally with ROM, right worse then left when moving shoulder forward.  Bilateral knee pain with extension, with small soft tissue swelling on the left.  Surgical scars bilateral knees well healed.   Skin:     General: Skin is warm and dry.      Comments: " Flat, annular, scattered, dark rash on bilateral lower legs.   Neurological:      General: No focal deficit present.      Mental Status: She is alert and oriented to person, place, and time. Mental status is at baseline.   Psychiatric:         Mood and Affect: Mood normal.         Behavior: Behavior normal.         Thought Content: Thought content normal.         Judgment: Judgment normal.          Results Review:   Imaging Results (Last 24 Hours)       Procedure Component Value Units Date/Time    XR Knee 1 or 2 View Bilateral [[id number]] Resulted: 06/06/24 1127     Updated: 06/06/24 1142            Procedures    Assessment / Plan    Assessment/Plan:   Diagnoses and all orders for this visit:    1. Rheumatoid arthritis involving multiple sites with positive rheumatoid factor (Primary)  Assessment & Plan:  +, +CCP 98 (August 2020), elevated sed rate  Primarily bilateral elbow involvement with destruction of right elbow  Previously saw Dr. Chambers 2020 [facility] rheumatology  *Current:  MTX restart 2/21(prior 2020 Dr Chambers), intermittent elbow steroid injection  Prior Plaquenil 9/20-7/23 (? itching/rash deep pigmentation shins), Celebrex (stopped by [facility] dermatology for ? drug rx causing itching legs/rash shins)  inflectra PA May 2021-she declined to start    Low disease activity from RA standpoint on methotrexate  Her main complaint is chronic left elbow pain which is from advanced degenerative osteoarthritis   She has debated on whether to have her elbow replaced with Dr. Aldridge, but is reluctant to proceed with surgery at her age.  Continue methotrexate which is well tolerated and effective. Her daughter state she does not take this regularly.  Instructed patient to take this regularly to help with RA and RA pain.  Lower extremity depigmentation/rash better with stopping hydroxychloroquine/celecoxib.  She still has itching that resolves with topical creams.  Dermatologist stopped Celebrex with the  thought of possible drug reaction to the celecoxib.  She can not tell much difference without the celecoxib  She previously chose not to start infliximab/ Inflectra when offered in 7/2021  Labs reviewed from 2/12/2024 [place].  Sed rate elevated at 75 but this is chronic for her RF IgA 15, Orovite GG 12, RF IgM greater than 100.  All of these are elevated.  Continue labs every 8-12 weeks while on methotrexate. Order provided for labs today  Follow-up in 3 months.  Her daughter accompanies her today    Orders:  -     Diclofenac Sodium (VOLTAREN) 1 % gel gel; Apply 4 g topically to the appropriate area as directed 4 (Four) Times a Day As Needed (joint pain).  Dispense: 100 g; Refill: 5  -     folic acid (FOLVITE) 1 MG tablet; Take 1 tablet by mouth Daily.  Dispense: 90 tablet; Refill: 1  -     methotrexate 2.5 MG tablet; Take 8 tablets by mouth 1 (One) Time Per Week. SAME DAY EACH WK  Dispense: 96 tablet; Refill: 0  -     C-reactive Protein; Standing  -     Sedimentation Rate; Standing  -     Comprehensive Metabolic Panel; Standing  -     CBC With Manual Differential; Standing    2. Encounter for long-term (current) use of high-risk medication  Assessment & Plan:  Methotrexate    Well tolerated and effective   We discussed risks of MTX. Risks include but are not limited to severe liver damage that can be fatal, the possible need for liver biopsy, bone marrow suppression that can lead to dangerously low blood counts, GI side effects including mouth sores and diarrhea, fatigue, and rare risk of severe pulmonary complications. There should be no alcohol consumed with MTX. MTX can cause severe fetal abnormalities whether the mother or father is taking the medication and thus must be avoided if pregnancy is a possibility.  All medication is to be taken one day a week only. The need for q 8 week labs and the need for folic acid supplementation were discussed.    Orders:  -     C-reactive Protein; Standing  -      Sedimentation Rate; Standing  -     Comprehensive Metabolic Panel; Standing  -     CBC With Manual Differential; Standing    3. Age-related osteoporosis without current pathological fracture  Assessment & Plan:  DEXA scan July 2022 with PCP-worsening osteoporosis  DEXA scan 7/6/20 with PCP:  T-score -2.3 left femoral neck (worse by 6%), T-score lumbar spine -2.3  Current:  Alendronate 2020   Prolia PA 8/22-never started due to patient concern about cost for 300 dollars per injection    Referred by her PCP for management of osteoporosis- worsening on latest bone density scan done with her PCP in July 2022 reviewed with patient  She admits she did not previously take Fosamax faithfully.  We started Prolia process back in 8/2022, but never started Prolia due to patient concern about cost of 300 dollars per injection    Recommend continue alendronate once weekly.  Refilled  Recommend continue calcium, vitamin-D, and weight-bearing exercise.  Update bone density scan when we are back at [facility].    Orders:  -     alendronate (FOSAMAX) 70 MG tablet; Take 1 tablet by mouth Every 7 (Seven) Days.  Dispense: 4 tablet; Refill: 5    4. Generalized osteoarthritis  Assessment & Plan:  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 If no fractures are present then I recommend physical therapy/water therapy.  If there are fractures then she will need to see orthopedics.    Her daughter asks about pain medications such as hydrocodone.  I explained we typically do not prescribe these medications but she can ask her PCP.  I explained that I would not be comfortable writing for a narcotic as she is taking gabapentin and has had a recent fall.  It is okay for her to use the CBD cream.  I did discuss with  the daughter and patient that CBD can show as THC in urine drug screen.    She has been started on gabapentin for leg pain that is suspected to be nerve related.    She has stop celecoxib as this was thought to perhaps cause itching/skin lesions lower extremities by her dermatologist at [facility].      Orders:  -     Diclofenac Sodium (VOLTAREN) 1 % gel gel; Apply 4 g topically to the appropriate area as directed 4 (Four) Times a Day As Needed (joint pain).  Dispense: 100 g; Refill: 5  -     XR Knee 1 or 2 View Bilateral  -     XR Shoulder 2+ View Bilateral; Future    5. Presence of both artificial knee joints  Assessment & Plan:  2009 with Dr. Carlos.    She fell on her knees one month ago.  Xray today.    Orders:  -     XR Knee 1 or 2 View Bilateral  -     XR Shoulder 2+ View Bilateral; Future        Follow Up:   Return in about 3 months (around 9/6/2024) for Dr. Frost.        MARKIE Bacon  [facility]

## 2024-06-10 ENCOUNTER — TELEPHONE (OUTPATIENT)
Age: 84
End: 2024-06-10
Payer: MEDICARE

## 2024-06-27 ENCOUNTER — TELEPHONE (OUTPATIENT)
Age: 84
End: 2024-06-27
Payer: MEDICARE

## 2024-06-27 NOTE — TELEPHONE ENCOUNTER
Marion from [facility] called to check on patient's knee x-ray report.  She said that SANDRA Meza wanted to review the x-rays before sending her for physical therapy.  The patient hadn't received the orders for PT yet.

## 2024-07-10 DIAGNOSIS — M15.9 GENERALIZED OSTEOARTHRITIS: Primary | ICD-10-CM

## 2024-08-01 ENCOUNTER — TELEPHONE (OUTPATIENT)
Dept: PHYSICAL THERAPY | Facility: OTHER | Age: 84
End: 2024-08-01
Payer: MEDICARE

## 2024-08-01 NOTE — TELEPHONE ENCOUNTER
Caller: Katherine Marroquin    Relationship: Self    What was the call regarding: PATIENT CANCELLED APPT TODAY BECAUSE THEY CANT MAKE IT

## 2024-09-09 ENCOUNTER — OFFICE VISIT (OUTPATIENT)
Age: 84
End: 2024-09-09
Payer: MEDICARE

## 2024-09-09 ENCOUNTER — LAB (OUTPATIENT)
Facility: HOSPITAL | Age: 84
End: 2024-09-09
Payer: MEDICARE

## 2024-09-09 VITALS
WEIGHT: 151 LBS | SYSTOLIC BLOOD PRESSURE: 136 MMHG | HEART RATE: 82 BPM | HEIGHT: 65 IN | BODY MASS INDEX: 25.16 KG/M2 | TEMPERATURE: 97.5 F | DIASTOLIC BLOOD PRESSURE: 76 MMHG

## 2024-09-09 DIAGNOSIS — Z79.899 ENCOUNTER FOR LONG-TERM (CURRENT) USE OF HIGH-RISK MEDICATION: ICD-10-CM

## 2024-09-09 DIAGNOSIS — M05.79 RHEUMATOID ARTHRITIS INVOLVING MULTIPLE SITES WITH POSITIVE RHEUMATOID FACTOR: ICD-10-CM

## 2024-09-09 DIAGNOSIS — M81.0 AGE-RELATED OSTEOPOROSIS WITHOUT CURRENT PATHOLOGICAL FRACTURE: ICD-10-CM

## 2024-09-09 DIAGNOSIS — M05.79 RHEUMATOID ARTHRITIS INVOLVING MULTIPLE SITES WITH POSITIVE RHEUMATOID FACTOR: Primary | ICD-10-CM

## 2024-09-09 LAB
BASOPHILS # BLD AUTO: 0.04 10*3/MM3 (ref 0–0.2)
BASOPHILS NFR BLD AUTO: 0.6 % (ref 0–1.5)
DEPRECATED RDW RBC AUTO: 41.8 FL (ref 37–54)
EOSINOPHIL # BLD AUTO: 0.46 10*3/MM3 (ref 0–0.4)
EOSINOPHIL NFR BLD AUTO: 7.4 % (ref 0.3–6.2)
ERYTHROCYTE [DISTWIDTH] IN BLOOD BY AUTOMATED COUNT: 12.7 % (ref 12.3–15.4)
HCT VFR BLD AUTO: 36.5 % (ref 34–46.6)
HGB BLD-MCNC: 12.3 G/DL (ref 12–15.9)
IMM GRANULOCYTES # BLD AUTO: 0.01 10*3/MM3 (ref 0–0.05)
IMM GRANULOCYTES NFR BLD AUTO: 0.2 % (ref 0–0.5)
LYMPHOCYTES # BLD AUTO: 2.21 10*3/MM3 (ref 0.7–3.1)
LYMPHOCYTES NFR BLD AUTO: 35.8 % (ref 19.6–45.3)
MCH RBC QN AUTO: 30.4 PG (ref 26.6–33)
MCHC RBC AUTO-ENTMCNC: 33.7 G/DL (ref 31.5–35.7)
MCV RBC AUTO: 90.3 FL (ref 79–97)
MONOCYTES # BLD AUTO: 0.52 10*3/MM3 (ref 0.1–0.9)
MONOCYTES NFR BLD AUTO: 8.4 % (ref 5–12)
NEUTROPHILS NFR BLD AUTO: 2.94 10*3/MM3 (ref 1.7–7)
NEUTROPHILS NFR BLD AUTO: 47.6 % (ref 42.7–76)
NRBC BLD AUTO-RTO: 0 /100 WBC (ref 0–0.2)
PLATELET # BLD AUTO: 289 10*3/MM3 (ref 140–450)
PMV BLD AUTO: 10.2 FL (ref 6–12)
RBC # BLD AUTO: 4.04 10*6/MM3 (ref 3.77–5.28)
WBC NRBC COR # BLD AUTO: 6.18 10*3/MM3 (ref 3.4–10.8)

## 2024-09-09 PROCEDURE — 86431 RHEUMATOID FACTOR QUANT: CPT

## 2024-09-09 PROCEDURE — 1159F MED LIST DOCD IN RCRD: CPT | Performed by: INTERNAL MEDICINE

## 2024-09-09 PROCEDURE — 86140 C-REACTIVE PROTEIN: CPT

## 2024-09-09 PROCEDURE — 1160F RVW MEDS BY RX/DR IN RCRD: CPT | Performed by: INTERNAL MEDICINE

## 2024-09-09 PROCEDURE — 86200 CCP ANTIBODY: CPT

## 2024-09-09 PROCEDURE — G2211 COMPLEX E/M VISIT ADD ON: HCPCS | Performed by: INTERNAL MEDICINE

## 2024-09-09 PROCEDURE — 80053 COMPREHEN METABOLIC PANEL: CPT

## 2024-09-09 PROCEDURE — 99214 OFFICE O/P EST MOD 30 MIN: CPT | Performed by: INTERNAL MEDICINE

## 2024-09-09 PROCEDURE — 85025 COMPLETE CBC W/AUTO DIFF WBC: CPT

## 2024-09-09 PROCEDURE — 85652 RBC SED RATE AUTOMATED: CPT

## 2024-09-09 PROCEDURE — 36415 COLL VENOUS BLD VENIPUNCTURE: CPT

## 2024-09-09 RX ORDER — ALENDRONATE SODIUM 70 MG/1
70 TABLET ORAL
Qty: 4 TABLET | Refills: 5 | Status: SHIPPED | OUTPATIENT
Start: 2024-09-09

## 2024-09-09 RX ORDER — FOLIC ACID 1 MG/1
1 TABLET ORAL DAILY
Qty: 90 TABLET | Refills: 1 | Status: SHIPPED | OUTPATIENT
Start: 2024-09-09

## 2024-09-09 RX ORDER — LANOLIN ALCOHOL/MO/W.PET/CERES
1 CREAM (GRAM) TOPICAL DAILY
COMMUNITY
Start: 2024-05-31

## 2024-09-09 RX ORDER — METHOTREXATE 2.5 MG/1
20 TABLET ORAL WEEKLY
Qty: 96 TABLET | Refills: 0 | Status: SHIPPED | OUTPATIENT
Start: 2024-09-09

## 2024-09-09 RX ORDER — CLOBETASOL PROPIONATE 0.5 MG/G
OINTMENT TOPICAL
COMMUNITY
Start: 2024-09-05

## 2024-09-09 RX ORDER — LOSARTAN POTASSIUM 25 MG/1
TABLET ORAL
COMMUNITY

## 2024-09-09 RX ORDER — MELOXICAM 7.5 MG/1
7.5 TABLET ORAL DAILY PRN
Qty: 30 TABLET | Refills: 5 | Status: SHIPPED | OUTPATIENT
Start: 2024-09-09

## 2024-09-09 NOTE — PATIENT INSTRUCTIONS
Methotrexate Tablets    What is this medication?  METHOTREXATE (METH oh TREX ate) treats autoimmune conditions, such as arthritis and psoriasis. It works by decreasing inflammation, which can reduce pain and prevent long-term injury to the joints and skin. It may also be used to treat some types of cancer. It works by slowing down the growth of cancer cells.  This medicine may be used for other purposes; ask your health care provider or pharmacist if you have questions.  COMMON BRAND NAME(S): Rheumatrex, Trexall    What should I tell my care team before I take this medication?  They need to know if you have any of these conditions:  Dehydration  Diabetes  Fluid in the stomach area or lungs  Frequently drink alcohol  Having surgery, including dental surgery  High cholesterol  Immune system problems  Inflammatory bowel disease, such as ulcerative colitis  Kidney disease  Liver disease  Low blood cell levels (white cells, red cells, and platelets)  Lung disease  Recent or ongoing radiation  Recent or upcoming vaccine  Stomach ulcers, other stomach or intestine problems  An unusual or allergic reaction to methotrexate, other medications, foods, dyes, or preservatives  Pregnant or trying to get pregnant  Breastfeeding    How should I use this medication?  Take this medication by mouth with water. Take it as directed on the prescription label. Do not take extra. Keep taking this medication until your care team tells you to stop.  Know why you are taking this medication and how you should take it. To treat conditions such as arthritis and psoriasis, this medication is taken ONCE A WEEK as a single dose or divided into 3 smaller doses taken 12 hours apart (do not take more than 3 doses 12 hours apart each week). This medication is NEVER taken daily to treat conditions other than cancer. Taking this medication more often than directed can cause serious side effects, even death. Talk to your care team about why you are taking  "this medication, how often you will take it, and what your dose is. Ask your care team to put the reason you take this medication on the prescription.  If you take this medication ONCE A WEEK, choose a day of the week before you start. Ask your pharmacist to include the day of the week on the label. Avoid \"Monday\", which could be misread as \"Morning\".  Handling this medication may be harmful. Talk to your care team about how to handle this medication. Special instructions may apply.  Talk to your care team about the use of this medication in children. While it may be prescribed for selected conditions, precautions do apply.  Overdosage: If you think you have taken too much of this medicine contact a poison control center or emergency room at once.  NOTE: This medicine is only for you. Do not share this medicine with others.    What if I miss a dose?  If you miss a dose, talk with your care team. Do not take double or extra doses.    What may interact with this medication?  Do not take this medication with any of the following:  Acitretin  Live virus vaccines  Probenecid  This medication may also interact with the following:  Alcohol  Aspirin and aspirin-like medications  Certain antibiotics, such as penicillin, neomycin, sulfamethoxazole; trimethoprim  Certain medications for stomach problems, such as lansoprazole, omeprazole, pantoprazole  Clozapine  Cyclosporine  Dapsone  Folic acid  Foscarnet  NSAIDs, medications for pain and inflammation, such as ibuprofen or naproxen  Phenytoin  Pyrimethamine  Steroid medications, such as prednisone or cortisone  Tacrolimus  Theophylline  This list may not describe all possible interactions. Give your health care provider a list of all the medicines, herbs, non-prescription drugs, or dietary supplements you use. Also tell them if you smoke, drink alcohol, or use illegal drugs. Some items may interact with your medicine.    What should I watch for while using this " medication?  Visit your care team for regular checks on your progress. It may be some time before you see the benefit from this medication.  You may need blood work done while you are taking this medication.  If your care team has also prescribed folic acid, they may instruct you to skip your folic acid dose on the day you take methotrexate.  This medication can make you more sensitive to the sun. Keep out of the sun. If you cannot avoid being in the sun, wear protective clothing and sunscreen. Do not use sun lamps, tanning beds, or tanning booths.  Check with your care team if you have severe diarrhea, nausea, and vomiting, or if you sweat a lot. The loss of too much body fluid may make it dangerous for you to take this medication.  This medication may increase your risk of getting an infection. Call your care team for advice if you get a fever, chills, sore throat, or other symptoms of a cold or flu. Do not treat yourself. Try to avoid being around people who are sick.  Talk to your care team about your risk of cancer. You may be more at risk for certain types of cancers if you take this medication.  Talk to your care team if you or your partner may be pregnant. Serious birth defects can occur if you take this medication during pregnancy and for 6 months after the last dose. You will need a negative pregnancy test before starting this medication. Contraception is recommended while taking this medication and for 6 months after the last dose. Your care team can help you find the option that works for you.  If your partner can get pregnant, use a condom during sex while taking this medication and for 3 months after the last dose.  Do not breastfeed while taking this medication and for 1 week after the last dose.  This medication may cause infertility. Talk to your care team if you are concerned about your fertility.    What side effects may I notice from receiving this medication?  Side effects that you should report  to your care team as soon as possible:  Allergic reactions--skin rash, itching, hives, swelling of the face, lips, tongue, or throat  Dry cough, shortness of breath or trouble breathing  Infection--fever, chills, cough, sore throat, wounds that don't heal, pain or trouble when passing urine, general feeling of discomfort or being unwell  Kidney injury--decrease in the amount of urine, swelling of the ankles, hands, or feet  Liver injury--right upper belly pain, loss of appetite, nausea, light-colored stool, dark yellow or brown urine, yellowing skin or eyes, unusual weakness or fatigue  Low red blood cell level--unusual weakness or fatigue, dizziness, headache, trouble breathing  Pain, tingling, or numbness in the hands or feet, muscle weakness, change in vision, confusion or trouble speaking, loss of balance or coordination, trouble walking, seizures  Redness, blistering, peeling, or loosening of the skin, including inside the mouth  Stomach bleeding--bloody or black, tar-like stools, vomiting blood or brown material that looks like coffee grounds  Stomach pain that is severe, does not away, or gets worse  Unusual bruising or bleeding  Side effects that usually do not require medical attention (report these to your care team if they continue or are bothersome):  Diarrhea  Dizziness  Hair loss  Nausea  Pain, redness, or swelling with sores inside the mouth or throat  Skin reactions on sun-exposed areas  Vomiting  This list may not describe all possible side effects. Call your doctor for medical advice about side effects. You may report side effects to FDA at 9-778-FDA-7812.    Where should I keep my medication?  Keep out of the reach of children and pets.  Store at room temperature between 20 and 25 degrees C (68 and 77 degrees F). Protect from light. Keep the container tightly closed. Get rid of any unused medication after the expiration date.  To get rid of medications that are no longer needed or have  :  Take the medication to a medication take-back program. Check with your pharmacy or law enforcement to find a location.  If you cannot return the medication, ask your pharmacist or care team how to get rid of this medication safely.  NOTE: This sheet is a summary. It may not cover all possible information. If you have questions about this medicine, talk to your doctor, pharmacist, or health care provider.  ©  [employer] (2024 00:00:00)

## 2024-09-09 NOTE — PROGRESS NOTES
Office Visit       Date: 09/09/2024   Patient Name: Katherine Marroquin  MRN: [id number]  YOB: 1940    Referring Physician: No ref. provider found     Chief Complaint: RA  Chief Complaint   Patient presents with    Follow-up       History of Present Illness: Katherine Marroquin is a 84 y.o. female who is here today for follow-up of rheumatoid arthritis.      She continues on methotrexate once weekly.  Well-tolerated.  No serious side effects    She reports doing worse.  She rates her pain 10 out of 10 and has morning stiffness.    She has started on gabapentin by an outside provider for nerve pain in her legs.  She needs refills of all medications.  She fell a few months ago.  She laid there for an hour.  Two men helped her up.    She has had worsening shoulder pain and knee pain since the fall.  Knee x-rays prior to visit showed stability of knee prostheses.  She uses CBD cream on her painful joints.  Elbows are sore and unable to extend the elbows.    Subjective   Review of Systems:      Past Medical History:   Past Medical History:   Diagnosis Date    Anxiety     Breast cancer 1979    RIGHT    Elevated lipids     Encounter for long-term (current) use of high-risk medication 06/04/2024    GERD (gastroesophageal reflux disease)     Hyperlipidemia     Insomnia     Obesity     Oral herpes     Osteoarthritis, generalized     Osteopenia     Osteoporosis     RA (rheumatoid arthritis)     Seropositive rheumatoid arthritis     Stomach ulcer        Past Surgical History:   Past Surgical History:   Procedure Laterality Date    BREAST BIOPSY Bilateral     DENTAL PROCEDURE  1942    MASTECTOMY  1979    RIGHT    OOPHORECTOMY Bilateral 1969    TOTAL ABDOMINAL HYSTERECTOMY  1969    TOTAL ABDOMINAL HYSTERECTOMY      TOTAL KNEE ARTHROPLASTY  2009    left    TOTAL KNEE ARTHROPLASTY  2011    right       Family History:   Family History   Problem Relation Age of Onset    Stroke Mother     Heart failure Father      Diabetes Daughter     Mental illness Daughter     Other (fluid retention) Daughter     Mental illness Son     Heart attack Maternal Aunt     Stroke Maternal Grandfather     Colon cancer Grandchild     Breast cancer Neg Hx     Ovarian cancer Neg Hx        Social History:   Social History     Socioeconomic History    Marital status:    Tobacco Use    Smoking status: Never    Smokeless tobacco: Never   Vaping Use    Vaping status: Never Used   Substance and Sexual Activity    Alcohol use: No    Drug use: No    Sexual activity: Defer       Medications:   Current Outpatient Medications:     alendronate (FOSAMAX) 70 MG tablet, Take 1 tablet by mouth Every 7 (Seven) Days., Disp: 4 tablet, Rfl: 5    aspirin 81 MG EC tablet, Take 1 tablet by mouth Daily., Disp: , Rfl:     clobetasol (TEMOVATE) 0.05 % ointment, APPLY A THIN LAYER TO LOWER LEGS TWICE DAILY FOR TWO WEEKS TAKE A WEEK BREAK THEN REPEAT, Disp: , Rfl:     famotidine (PEPCID) 20 MG tablet, Take 1 tablet by mouth 2 (Two) Times a Day., Disp: , Rfl:     folic acid (FOLVITE) 1 MG tablet, Take 1 tablet by mouth Daily., Disp: 90 tablet, Rfl: 1    gabapentin (NEURONTIN) 100 MG capsule, Take 1 capsule by mouth 3 (Three) Times a Day., Disp: , Rfl:     losartan (COZAAR) 25 MG tablet, TAKE 1/2 TABLET BY MOUTH DAILY IN THE MORNING FOR HIGH BLOOD PRESSURE, Disp: , Rfl:     methotrexate 2.5 MG tablet, Take 8 tablets by mouth 1 (One) Time Per Week. SAME DAY EACH WK, Disp: 96 tablet, Rfl: 0    omeprazole (priLOSEC) 20 MG capsule, Take 1 capsule by mouth Daily., Disp: , Rfl:     PROAIR RESPICLICK 108 (90 Base) MCG/ACT inhaler, As Needed., Disp: , Rfl: 0    QVAR REDIHALER 40 MCG/ACT inhaler, 2 (Two) Times a Day., Disp: , Rfl: 0    rosuvastatin (CRESTOR) 20 MG tablet, Take 1 tablet by mouth Daily., Disp: , Rfl:     vitamin B-12 (CYANOCOBALAMIN) 1000 MCG tablet, Take 1 tablet by mouth Daily., Disp: , Rfl:     VITAMIN D, ERGOCALCIFEROL, PO, Take 1 capsule by mouth 1 (One) Time  "Per Week. FOR 12 WEEKS AS DIRECTED, Disp: , Rfl:     meloxicam (MOBIC) 7.5 MG tablet, Take 1 tablet by mouth Daily As Needed for Mild Pain., Disp: 30 tablet, Rfl: 5    Allergies:   Allergies   Allergen Reactions    Penicillins Hives       I have reviewed and updated the patient's chief complaint, history of present illness, review of systems, past medical history, surgical history, family history, social history, medications and allergy list as appropriate.     Objective    Vital Signs:   Vitals:    09/09/24 1352   BP: 136/76   BP Location: Left arm   Patient Position: Sitting   Cuff Size: Adult   Pulse: 82   Temp: 97.5 °F (36.4 °C)   Weight: 68.5 kg (151 lb)   Height: 165.1 cm (65\")   PainSc: 10-Worst pain ever   PainLoc: Generalized       Body mass index is 25.13 kg/m².          Physical Exam:  MUSCULOSKELETAL:   No peripheral synovitis  Unable to fully close right 2nd finger  Unable to extend either elbow fullly with crepitus and pain  Well healed scars bilateral knees from joint replacement    Complete joint exam was performed including the MCPs, PIPs, DIPs of the hands, wrists, elbows, shoulders, hips, knees and ankles.  No soft tissue swelling or tenderness is present except as above.    General: The patient is well-developed and well nourished. Cooperative, alert and oriented. Affect is normal. Hydration appears normal.   HEENT: Normocephalic and atraumatic. No notable alopecia. Lids and conjunctiva are normal. Pupils are equal and sclera are clear. Oropharynx is clear   NECK neck is supple without adenopathy, masses or thyromegaly.   CARDIOVASCULAR: Regular rate and rhythm. No murmurs, rubs or gallops   LUNGS: Effort is normal. Lungs are clear bilateral   ABDOMEN: Not examined  EXTREMITIES: Peripheral pulses are intact. No clubbing.   SKIN: No rashes. No subcutaneous nodules. No digital ulcers. No sclerodactyly.   NEUROLOGIC: Gait is normal. Strength testing is normal.  No focal neurologic deficits "     Results Review:   Imaging Results (Last 24 Hours)       ** No results found for the last 24 hours. **            Procedures    Assessment / Plan      1. Rheumatoid arthritis involving multiple sites with positive rheumatoid factor (Primary)  Assessment & Plan:  +, +CCP 98 (August 2020), elevated sed rate  Primarily bilateral elbow involvement with destruction of right elbow  Previously saw Dr. Chambers 2020 [facility] rheumatology  **Current:  MTX restart 2/21(prior 2020 Dr Chambers), intermittent elbow steroid injection Dr Aldridge  Prior Plaquenil 9/20-7/23 (? itching/rash deep pigmentation shins), Celebrex (stopped by [facility] dermatology for ? drug rx causing itching legs/rash shins)  inflectra PA May 2021-she declined to start       Low disease activity from RA standpoint on methotrexate  Her main complaint is chronic bilateral elbow pain which is from advanced degenerative osteoarthritis   She has debated on whether to have her elbow replaced with Dr. Aldridge, but is reluctant to proceed with surgery at her age.    Continue methotrexate once weekly which is well tolerated and effective.   -Add meloxicam 7.5 mg once daily for OA pain  -Knee x-rays reviewed from last visit which showed no loosening of knee prosthesis hardware    Lower extremity depigmentation/rash better with stopping hydroxychloroquine/celecoxib.  She still has itching that resolves with topical creams.  Dermatologist stopped Celebrex with the thought of possible drug reaction to the celecoxib.      She previously chose not to start infliximab/ Inflectra when offered in 7/2021  Labs reviewed and are stable  Continue labs every 8-12 weeks while on methotrexate. Order provided for labs today  Follow-up in 3 months.  Her daughter accompanies her today  Risks of NSAIDs discussed including GI upset, GI bleeding, renal or hepatic risks and the risk of cardiovascular disease and stroke.  Warned patient not to take other NSAIDs including  over-the-counter NSAIDs          2. Encounter for long-term (current) use of high-risk medication  Assessment & Plan:  Methotrexate     Well tolerated and effective   We discussed risks of MTX. Risks include but are not limited to severe liver damage that can be fatal, the possible need for liver biopsy, bone marrow suppression that can lead to dangerously low blood counts, GI side effects including mouth sores and diarrhea, fatigue, and rare risk of severe pulmonary complications. There should be no alcohol consumed with MTX. MTX can cause severe fetal abnormalities whether the mother or father is taking the medication and thus must be avoided if pregnancy is a possibility.  All medication is to be taken one day a week only. The need for q 8 week labs and the need for folic acid supplementation were discussed.       3. Age-related osteoporosis without current pathological fracture  Assessment & Plan:  DEXA scan July 2022 with PCP-worsening osteoporosis  DEXA scan 7/6/20 with PCP:  T-score -2.3 left femoral neck (worse by 6%), T-score lumbar spine -2.3  **Current:  Alendronate 2020   Prolia PA 8/22-never started due to patient concern about cost for 300 dollars per injection     Referred by her PCP for management of osteoporosis- worsening on latest bone density scan done with her PCP in July 2022 reviewed with patient  She admits she did not previously take Fosamax faithfully.  We started Prolia process back in 8/2022, but never started Prolia due to patient concern about cost of 300 dollars per injection     Recommend continue alendronate once weekly.  Refilled  Recommend continue calcium, vitamin-D, and weight-bearing exercise.  Update bone density scan when we are back at [place] in 2025.    4. Generalized osteoarthritis  Assessment & Plan:  Advanced OA right elbow-follows with Dr. Aldridge  She is debating on having right elbow joint replacement but she is reluctant to proceed with surgery given her age    -Knee  x-ray showed no loosening of hardware from joint replacements  -She fell a few months back on her knees.  Recommend physical therapy.  Order provided  -Recommend meloxicam as needed     It is okay for her to use the CBD cream.  I did discuss with the daughter and patient that CBD can show as THC in urine drug screen.     She has been started on gabapentin by an outside provider for leg pain that is suspected to be nerve related.     She stopped celecoxib as this was thought to perhaps cause itching/skin lesions lower extremities by her dermatologist at [facility].            5. Presence of both artificial knee joints  Assessment & Plan:  2009 with Dr. Carlos.           Assessment/Plan:   Diagnoses and all orders for this visit:    1. Rheumatoid arthritis involving multiple sites with positive rheumatoid factor (Primary)  -     Comprehensive Metabolic Panel; Future  -     C-reactive Protein; Future  -     CBC Auto Differential; Future  -     Sedimentation Rate; Future  -     Rheumatoid Factor; Future  -     Cyclic Citrul Peptide Antibody, IgG / IgA; Future  -     methotrexate 2.5 MG tablet; Take 8 tablets by mouth 1 (One) Time Per Week. SAME DAY EACH WK  Dispense: 96 tablet; Refill: 0  -     folic acid (FOLVITE) 1 MG tablet; Take 1 tablet by mouth Daily.  Dispense: 90 tablet; Refill: 1  -     Ambulatory Referral to Physical Therapy for Evaluation & Treatment    2. Encounter for long-term (current) use of high-risk medication  -     Comprehensive Metabolic Panel; Future  -     C-reactive Protein; Future  -     CBC Auto Differential; Future  -     Sedimentation Rate; Future  -     Rheumatoid Factor; Future  -     Cyclic Citrul Peptide Antibody, IgG / IgA; Future    3. Age-related osteoporosis without current pathological fracture  -     alendronate (FOSAMAX) 70 MG tablet; Take 1 tablet by mouth Every 7 (Seven) Days.  Dispense: 4 tablet; Refill: 5    Other orders  -     meloxicam (MOBIC) 7.5 MG tablet; Take 1  tablet by mouth Daily As Needed for Mild Pain.  Dispense: 30 tablet; Refill: 5          Follow Up:   Return in about 3 months (around 12/9/2024).        Satish Frost MD  [facility]

## 2024-09-10 LAB
ALBUMIN SERPL-MCNC: 4.1 G/DL (ref 3.5–5.2)
ALBUMIN/GLOB SERPL: 1 G/DL
ALP SERPL-CCNC: 91 U/L (ref 39–117)
ALT SERPL W P-5'-P-CCNC: 10 U/L (ref 1–33)
ANION GAP SERPL CALCULATED.3IONS-SCNC: 11.8 MMOL/L (ref 5–15)
AST SERPL-CCNC: 17 U/L (ref 1–32)
BILIRUB SERPL-MCNC: 0.3 MG/DL (ref 0–1.2)
BUN SERPL-MCNC: 14 MG/DL (ref 8–23)
BUN/CREAT SERPL: 20.6 (ref 7–25)
CALCIUM SPEC-SCNC: 9.6 MG/DL (ref 8.6–10.5)
CHLORIDE SERPL-SCNC: 103 MMOL/L (ref 98–107)
CHROMATIN AB SERPL-ACNC: 68.4 IU/ML (ref 0–14)
CO2 SERPL-SCNC: 22.2 MMOL/L (ref 22–29)
CREAT SERPL-MCNC: 0.68 MG/DL (ref 0.57–1)
CRP SERPL-MCNC: 1.16 MG/DL (ref 0–0.5)
EGFRCR SERPLBLD CKD-EPI 2021: 84.9 ML/MIN/1.73
ERYTHROCYTE [SEDIMENTATION RATE] IN BLOOD: 80 MM/HR (ref 0–30)
GLOBULIN UR ELPH-MCNC: 4.1 GM/DL
GLUCOSE SERPL-MCNC: 84 MG/DL (ref 65–99)
POTASSIUM SERPL-SCNC: 4.1 MMOL/L (ref 3.5–5.2)
PROT SERPL-MCNC: 8.2 G/DL (ref 6–8.5)
SODIUM SERPL-SCNC: 137 MMOL/L (ref 136–145)

## 2024-09-11 LAB — CCP IGA+IGG SERPL IA-ACNC: >250 UNITS (ref 0–19)

## 2024-12-05 NOTE — ASSESSMENT & PLAN NOTE
+, +CCP 98 (August 2020), elevated sed rate  9/2024: CCP > 250, RF 68.4  Primarily bilateral elbow involvement with destruction of right elbow  Previously saw Dr. Chambers 2020 [facility] rheumatology  **Current:  MTX restart 2/21(prior 2020 Dr Chambers), intermittent elbow steroid injection Dr Aldridge  Prior Plaquenil 9/20-7/23 (? itching/rash deep pigmentation shins), Celebrex (stopped by [facility] dermatology for ? drug rx causing itching legs/rash shins)  inflectra PA May 2021-she declined to start        Low disease activity from RA standpoint on methotrexate  Her main complaint is chronic bilateral elbow pain which is from advanced degenerative osteoarthritis   She has debated on whether to have her elbow replaced with Dr. Aldridge, but is reluctant to proceed with surgery at her age.     Continue methotrexate once weekly which is well tolerated and effective.   - Continue meloxicam 7.5 mg once daily for OA pain  -Knee x-rays reviewed  which showed no loosening of knee prosthesis hardware   - She is in PT and OT.    Lower extremity depigmentation/rash better with stopping hydroxychloroquine/celecoxib.  She still has itching that resolves with topical creams.  Dermatologist stopped Celebrex with the thought of possible drug reaction to the celecoxib.       She previously chose not to start infliximab/ Inflectra when offered in 7/2021  Labs reviewed and are stable  Continue labs every 8-12 weeks while on methotrexate. Order provided for labs today  Follow-up in 3 months.  Her daughter accompanies her today  Risks of NSAIDs discussed including GI upset, GI bleeding, renal or hepatic risks and the risk of cardiovascular disease and stroke.  Warned patient not to take other NSAIDs including over-the-counter NSAIDs

## 2024-12-05 NOTE — ASSESSMENT & PLAN NOTE
DEXA scan July 2022 with PCP-worsening osteoporosis  DEXA scan 7/6/20 with PCP:  T-score -2.3 left femoral neck (worse by 6%), T-score lumbar spine -2.3  Current:  Alendronate 2020   Prolia PA 8/22-never started due to patient concern about cost for 300 dollars per injection    Referred by her PCP for management of osteoporosis- worsening on latest bone density scan done with her PCP in July 2022 reviewed with patient  She admits she did not previously take Fosamax faithfully.  We started Prolia process back in 8/2022, but never started Prolia due to patient concern about cost of 300 dollars per injection    Recommend continue alendronate once weekly.  Refilled  Recommend continue calcium, vitamin-D, and weight-bearing exercise.  No recent falls  Update bone density scan when we are back at [place].

## 2024-12-05 NOTE — ASSESSMENT & PLAN NOTE
Advanced OA right elbow-follows with Dr. Aldridge  She is debating on having right elbow joint replacement but she is reluctant to proceed with surgery given her age    -Knee x-ray showed no loosening of hardware from joint replacements  -She fell a few months back on her knees.    - Continue PT.  She reports this is through home PT. She reports it is doing well.  -Recommend meloxicam as needed     It is okay for her to use the CBD cream.  I did discuss with the daughter and patient that CBD can show as THC in urine drug screen.     She has been started on gabapentin by an outside provider for leg pain that is suspected to be nerve related.     She stopped celecoxib as this was thought to perhaps cause itching/skin lesions lower extremities by her dermatologist at [facility].

## 2024-12-09 ENCOUNTER — OFFICE VISIT (OUTPATIENT)
Age: 84
End: 2024-12-09
Payer: MEDICARE

## 2024-12-09 VITALS
BODY MASS INDEX: 34.02 KG/M2 | DIASTOLIC BLOOD PRESSURE: 70 MMHG | WEIGHT: 147 LBS | SYSTOLIC BLOOD PRESSURE: 138 MMHG | HEART RATE: 95 BPM | TEMPERATURE: 98.1 F | HEIGHT: 55 IN

## 2024-12-09 DIAGNOSIS — M15.9 GENERALIZED OSTEOARTHRITIS: ICD-10-CM

## 2024-12-09 DIAGNOSIS — M81.0 AGE-RELATED OSTEOPOROSIS WITHOUT CURRENT PATHOLOGICAL FRACTURE: ICD-10-CM

## 2024-12-09 DIAGNOSIS — M05.79 RHEUMATOID ARTHRITIS INVOLVING MULTIPLE SITES WITH POSITIVE RHEUMATOID FACTOR: Primary | ICD-10-CM

## 2024-12-09 DIAGNOSIS — Z96.653 PRESENCE OF BOTH ARTIFICIAL KNEE JOINTS: ICD-10-CM

## 2024-12-09 DIAGNOSIS — Z79.899 ENCOUNTER FOR LONG-TERM (CURRENT) USE OF HIGH-RISK MEDICATION: ICD-10-CM

## 2024-12-09 PROCEDURE — 99214 OFFICE O/P EST MOD 30 MIN: CPT | Performed by: NURSE PRACTITIONER

## 2024-12-09 PROCEDURE — G2211 COMPLEX E/M VISIT ADD ON: HCPCS | Performed by: NURSE PRACTITIONER

## 2024-12-09 RX ORDER — ALENDRONATE SODIUM 70 MG/1
70 TABLET ORAL
Qty: 4 TABLET | Refills: 5 | Status: SHIPPED | OUTPATIENT
Start: 2024-12-09

## 2024-12-09 RX ORDER — MELOXICAM 7.5 MG/1
7.5 TABLET ORAL DAILY PRN
Qty: 30 TABLET | Refills: 5 | Status: SHIPPED | OUTPATIENT
Start: 2024-12-09

## 2024-12-09 RX ORDER — METHOTREXATE 2.5 MG/1
20 TABLET ORAL WEEKLY
Qty: 96 TABLET | Refills: 0 | Status: SHIPPED | OUTPATIENT
Start: 2024-12-09

## 2024-12-09 RX ORDER — FOLIC ACID 1 MG/1
1 TABLET ORAL DAILY
Qty: 90 TABLET | Refills: 1 | Status: SHIPPED | OUTPATIENT
Start: 2024-12-09

## 2024-12-09 NOTE — PROGRESS NOTES
Office Visit       Date: 12/09/2024   Patient Name: Katherine Marroquin  MRN: [id number]  YOB: 1940    Referring Physician: No ref. provider found     Chief Complaint:   Chief Complaint   Patient presents with    Follow-up    Rheumatoid Arthritis    Generalized osteoarthritis       History of Present Illness: Katherine Marroquin is a 84 y.o. female who is here today for follow-up of rheumatoid arthritis.  Today she reports feeling the same.  She rates her pain 7 out of 10 and has 15 minutes of morning stiffness.  Currently we are prescribing her methotrexate 20 mg weekly, meloxicam 7.5 mg daily as needed, folic acid 1 mg daily alendronate 70 mg weekly.      Subjective   Review of Systems:  Positive for sneezing, appetite change.  All other review of symptoms are negative.    Past Medical History:   Past Medical History:   Diagnosis Date    Anxiety     Breast cancer 1979    RIGHT    Elevated lipids     Encounter for long-term (current) use of high-risk medication 06/04/2024    GERD (gastroesophageal reflux disease)     Hyperlipidemia     Insomnia     Obesity     Oral herpes     Osteoarthritis, generalized     Osteopenia     Osteoporosis     RA (rheumatoid arthritis)     Seropositive rheumatoid arthritis     Stomach ulcer        Past Surgical History:   Past Surgical History:   Procedure Laterality Date    BREAST BIOPSY Bilateral     DENTAL PROCEDURE  1942    MASTECTOMY  1979    RIGHT    OOPHORECTOMY Bilateral 1969    TOTAL ABDOMINAL HYSTERECTOMY  1969    TOTAL ABDOMINAL HYSTERECTOMY      TOTAL KNEE ARTHROPLASTY  2009    left    TOTAL KNEE ARTHROPLASTY  2011    right       Family History:   Family History   Problem Relation Age of Onset    Stroke Mother     Heart failure Father     Diabetes Daughter     Mental illness Daughter     Other (fluid retention) Daughter     Mental illness Son     Heart attack Maternal Aunt     Stroke Maternal Grandfather     Colon cancer Grandchild     Breast cancer Neg Hx      Ovarian cancer Neg Hx        Social History:   Social History     Socioeconomic History    Marital status:    Tobacco Use    Smoking status: Never    Smokeless tobacco: Never   Vaping Use    Vaping status: Never Used   Substance and Sexual Activity    Alcohol use: No    Drug use: No    Sexual activity: Defer       Medications:   Current Outpatient Medications:     alendronate (FOSAMAX) 70 MG tablet, Take 1 tablet by mouth Every 7 (Seven) Days., Disp: 4 tablet, Rfl: 5    aspirin 81 MG EC tablet, Take 1 tablet by mouth Daily., Disp: , Rfl:     clobetasol (TEMOVATE) 0.05 % ointment, APPLY A THIN LAYER TO LOWER LEGS TWICE DAILY FOR TWO WEEKS TAKE A WEEK BREAK THEN REPEAT, Disp: , Rfl:     famotidine (PEPCID) 20 MG tablet, Take 1 tablet by mouth 2 (Two) Times a Day., Disp: , Rfl:     folic acid (FOLVITE) 1 MG tablet, Take 1 tablet by mouth Daily., Disp: 90 tablet, Rfl: 1    gabapentin (NEURONTIN) 100 MG capsule, Take 1 capsule by mouth 3 (Three) Times a Day., Disp: , Rfl:     losartan (COZAAR) 25 MG tablet, TAKE 1/2 TABLET BY MOUTH DAILY IN THE MORNING FOR HIGH BLOOD PRESSURE, Disp: , Rfl:     meloxicam (MOBIC) 7.5 MG tablet, Take 1 tablet by mouth Daily As Needed for Mild Pain., Disp: 30 tablet, Rfl: 5    methotrexate 2.5 MG tablet, Take 8 tablets by mouth 1 (One) Time Per Week. SAME DAY EACH WK, Disp: 96 tablet, Rfl: 0    omeprazole (priLOSEC) 20 MG capsule, Take 1 capsule by mouth Daily., Disp: , Rfl:     PROAIR RESPICLICK 108 (90 Base) MCG/ACT inhaler, As Needed., Disp: , Rfl: 0    QVAR REDIHALER 40 MCG/ACT inhaler, 2 (Two) Times a Day., Disp: , Rfl: 0    rosuvastatin (CRESTOR) 20 MG tablet, Take 1 tablet by mouth Daily., Disp: , Rfl:     vitamin B-12 (CYANOCOBALAMIN) 1000 MCG tablet, Take 1 tablet by mouth Daily., Disp: , Rfl:     VITAMIN D, ERGOCALCIFEROL, PO, Take 1 capsule by mouth 1 (One) Time Per Week. FOR 12 WEEKS AS DIRECTED, Disp: , Rfl:     Allergies:   Allergies   Allergen Reactions    Penicillins  "Hives       I have reviewed and updated the patient's chief complaint, history of present illness, review of systems, past medical history, surgical history, family history, social history, medications and allergy list as appropriate.     Objective    Vital Signs:   Vitals:    12/09/24 1638   BP: 138/70   BP Location: Left arm   Pulse: 95   Temp: 98.1 °F (36.7 °C)   Weight: 66.7 kg (147 lb)   Height: 65 cm (25.59\")   PainSc:   7   PainLoc: Elbow  Comment: left     Body mass index is 157.81 kg/m².           Physical Exam:  Physical Exam  Vitals reviewed.   Constitutional:       Appearance: Normal appearance.   HENT:      Head: Normocephalic and atraumatic.      Right Ear: Decreased hearing noted.      Left Ear: Decreased hearing noted.      Mouth/Throat:      Mouth: Mucous membranes are moist.   Eyes:      Conjunctiva/sclera: Conjunctivae normal.   Cardiovascular:      Rate and Rhythm: Normal rate and regular rhythm.      Pulses: Normal pulses.      Heart sounds: Normal heart sounds.   Pulmonary:      Effort: Pulmonary effort is normal.      Breath sounds: Normal breath sounds.   Musculoskeletal:         General: Normal range of motion.      Cervical back: Normal range of motion and neck supple.      Comments: No synovitis  [place] bilaterally  CMC squaring  Tender elbows bilaterally  Swelling of bilateral elbows  Tender right lateral epicodondyle  Bilateral knees have been replaced   Skin:     General: Skin is warm and dry.   Neurological:      General: No focal deficit present.      Mental Status: She is alert and oriented to person, place, and time. Mental status is at baseline.   Psychiatric:         Mood and Affect: Mood normal.         Behavior: Behavior normal.         Thought Content: Thought content normal.         Judgment: Judgment normal.          Results Review:   Imaging Results (Last 24 Hours)       ** No results found for the last 24 hours. **            Procedures    Assessment / Plan  "   Assessment/Plan:   Diagnoses and all orders for this visit:    1. Rheumatoid arthritis involving multiple sites with positive rheumatoid factor (Primary)  Assessment & Plan:  +, +CCP 98 (August 2020), elevated sed rate  9/2024: CCP > 250, RF 68.4  Primarily bilateral elbow involvement with destruction of right elbow  Previously saw Dr. Chambers 2020 [facility] rheumatology  **Current:  MTX restart 2/21(prior 2020 Dr Chambers), intermittent elbow steroid injection Dr Aldridge  Prior Plaquenil 9/20-7/23 (? itching/rash deep pigmentation shins), Celebrex (stopped by [facility] dermatology for ? drug rx causing itching legs/rash shins)  inflectra PA May 2021-she declined to start        Low disease activity from RA standpoint on methotrexate  Her main complaint is chronic bilateral elbow pain which is from advanced degenerative osteoarthritis   She has debated on whether to have her elbow replaced with Dr. Aldridge, but is reluctant to proceed with surgery at her age.     Continue methotrexate once weekly which is well tolerated and effective.   - Continue meloxicam 7.5 mg once daily for OA pain  -Knee x-rays reviewed  which showed no loosening of knee prosthesis hardware   - She is in PT and OT.    Lower extremity depigmentation/rash better with stopping hydroxychloroquine/celecoxib.  She still has itching that resolves with topical creams.  Dermatologist stopped Celebrex with the thought of possible drug reaction to the celecoxib.       She previously chose not to start infliximab/ Inflectra when offered in 7/2021  Labs reviewed and are stable  Continue labs every 8-12 weeks while on methotrexate. Order provided for labs today  Follow-up in 3 months.  Her daughter accompanies her today  Risks of NSAIDs discussed including GI upset, GI bleeding, renal or hepatic risks and the risk of cardiovascular disease and stroke.  Warned patient not to take other NSAIDs including over-the-counter NSAIDs    Orders:  -      methotrexate 2.5 MG tablet; Take 8 tablets by mouth 1 (One) Time Per Week. SAME DAY EACH WK  Dispense: 96 tablet; Refill: 0  -     folic acid (FOLVITE) 1 MG tablet; Take 1 tablet by mouth Daily.  Dispense: 90 tablet; Refill: 1  -     C-reactive Protein; Standing  -     Sedimentation Rate; Standing  -     Comprehensive Metabolic Panel; Standing  -     CBC With Manual Differential; Standing    2. Generalized osteoarthritis  Assessment & Plan:  Advanced OA right elbow-follows with Dr. Aldridge  She is debating on having right elbow joint replacement but she is reluctant to proceed with surgery given her age    -Knee x-ray showed no loosening of hardware from joint replacements  -She fell a few months back on her knees.    - Continue PT.  She reports this is through home PT. She reports it is doing well.  -Recommend meloxicam as needed     It is okay for her to use the CBD cream.  I did discuss with the daughter and patient that CBD can show as THC in urine drug screen.     She has been started on gabapentin by an outside provider for leg pain that is suspected to be nerve related.     She stopped celecoxib as this was thought to perhaps cause itching/skin lesions lower extremities by her dermatologist at [facility].        3. Encounter for long-term (current) use of high-risk medication  Assessment & Plan:  Methotrexate    Well tolerated and effective   We discussed risks of MTX. Risks include but are not limited to severe liver damage that can be fatal, the possible need for liver biopsy, bone marrow suppression that can lead to dangerously low blood counts, GI side effects including mouth sores and diarrhea, fatigue, and rare risk of severe pulmonary complications. There should be no alcohol consumed with MTX. MTX can cause severe fetal abnormalities whether the mother or father is taking the medication and thus must be avoided if pregnancy is a possibility.  All medication is to be taken one day a week  only. The need for q 8 week labs and the need for folic acid supplementation were discussed.    Orders:  -     C-reactive Protein; Standing  -     Sedimentation Rate; Standing  -     Comprehensive Metabolic Panel; Standing  -     CBC With Manual Differential; Standing    4. Presence of both artificial knee joints  Assessment & Plan:  2009 with Dr. Carlos.          5. Age-related osteoporosis without current pathological fracture  Assessment & Plan:  DEXA scan July 2022 with PCP-worsening osteoporosis  DEXA scan 7/6/20 with PCP:  T-score -2.3 left femoral neck (worse by 6%), T-score lumbar spine -2.3  Current:  Alendronate 2020   Prolia PA 8/22-never started due to patient concern about cost for 300 dollars per injection    Referred by her PCP for management of osteoporosis- worsening on latest bone density scan done with her PCP in July 2022 reviewed with patient  She admits she did not previously take Fosamax faithfully.  We started Prolia process back in 8/2022, but never started Prolia due to patient concern about cost of 300 dollars per injection    Recommend continue alendronate once weekly.  Refilled  Recommend continue calcium, vitamin-D, and weight-bearing exercise.  No recent falls  Update bone density scan when we are back at [place].    Orders:  -     alendronate (FOSAMAX) 70 MG tablet; Take 1 tablet by mouth Every 7 (Seven) Days.  Dispense: 4 tablet; Refill: 5    Other orders  -     meloxicam (MOBIC) 7.5 MG tablet; Take 1 tablet by mouth Daily As Needed for Mild Pain.  Dispense: 30 tablet; Refill: 5        Follow Up:   Return in about 3 months (around 3/9/2025) for Dr. Frost, MARKIE Simeon.        MARKIE Bacon  [facility]

## 2025-01-27 ENCOUNTER — TELEPHONE (OUTPATIENT)
Dept: NEUROLOGY | Facility: CLINIC | Age: 85
End: 2025-01-27
Payer: MEDICARE

## 2025-01-27 NOTE — TELEPHONE ENCOUNTER
Provider: AMBROCIO     Caller: Pauline Marroquin    Relationship to Patient: Emergency Contact    Phone Number: [phone]    Reason for Call: PATIENT HAS AN APPOINTMENT FOR TOMORROW @ 3PM BUT WAS SENT A MESSAGE THAT IT IS CANCELLED. PLEASE CONTACT PATIENTS DAUGHTER TO RESCHEDULE. PATIENT DAUGHTER IS REQUESTING FOR A TEXT MESSAGE TO BE SENT IF SHE DOESN'T ANSWER.     PLEASE REVIEW

## 2025-04-09 ENCOUNTER — APPOINTMENT (OUTPATIENT)
Facility: HOSPITAL | Age: 85
End: 2025-04-09
Payer: MEDICARE

## 2025-04-09 ENCOUNTER — HOSPITAL ENCOUNTER (OUTPATIENT)
Facility: HOSPITAL | Age: 85
Setting detail: OBSERVATION
Discharge: HOME | End: 2025-04-10
Attending: FAMILY MEDICINE | Admitting: STUDENT IN AN ORGANIZED HEALTH CARE EDUCATION/TRAINING PROGRAM
Payer: MEDICARE

## 2025-04-09 DIAGNOSIS — R79.89 POSITIVE D DIMER: ICD-10-CM

## 2025-04-09 DIAGNOSIS — J47.9 BRONCHIECTASIS WITHOUT COMPLICATION: ICD-10-CM

## 2025-04-09 DIAGNOSIS — R07.9 CHEST PAIN, UNSPECIFIED TYPE: Primary | ICD-10-CM

## 2025-04-09 DIAGNOSIS — M79.604 LEG PAIN, BILATERAL: ICD-10-CM

## 2025-04-09 DIAGNOSIS — M79.605 LEG PAIN, BILATERAL: ICD-10-CM

## 2025-04-09 PROBLEM — Z79.4 ENCOUNTER FOR LONG-TERM (CURRENT) USE OF INSULIN: Status: ACTIVE | Noted: 2019-06-25

## 2025-04-09 LAB
ALBUMIN SERPL-MCNC: 3.7 G/DL (ref 3.5–5.2)
ALBUMIN/GLOB SERPL: 0.8 G/DL
ALP SERPL-CCNC: 101 U/L (ref 39–117)
ALT SERPL W P-5'-P-CCNC: 19 U/L (ref 1–33)
ANION GAP SERPL CALCULATED.3IONS-SCNC: 14.3 MMOL/L (ref 5–15)
AST SERPL-CCNC: 33 U/L (ref 1–32)
BASOPHILS # BLD AUTO: 0.02 10*3/MM3 (ref 0–0.2)
BASOPHILS NFR BLD AUTO: 0.2 % (ref 0–1.5)
BILIRUB SERPL-MCNC: 0.5 MG/DL (ref 0–1.2)
BUN SERPL-MCNC: 10 MG/DL (ref 8–23)
BUN/CREAT SERPL: 18.9 (ref 7–25)
CALCIUM SPEC-SCNC: 9.6 MG/DL (ref 8.6–10.5)
CHLORIDE SERPL-SCNC: 103 MMOL/L (ref 98–107)
CK SERPL-CCNC: 35 U/L (ref 20–180)
CO2 SERPL-SCNC: 21.7 MMOL/L (ref 22–29)
CREAT SERPL-MCNC: 0.53 MG/DL (ref 0.57–1)
CRP SERPL-MCNC: 1.76 MG/DL (ref 0–0.5)
D DIMER PPP FEU-MCNC: 4.32 MCGFEU/ML (ref 0–0.86)
DEPRECATED RDW RBC AUTO: 43.7 FL (ref 37–54)
EGFRCR SERPLBLD CKD-EPI 2021: 90.2 ML/MIN/1.73
EOSINOPHIL # BLD AUTO: 0.09 10*3/MM3 (ref 0–0.4)
EOSINOPHIL NFR BLD AUTO: 1 % (ref 0.3–6.2)
ERYTHROCYTE [DISTWIDTH] IN BLOOD BY AUTOMATED COUNT: 12.9 % (ref 12.3–15.4)
GEN 5 1HR TROPONIN T REFLEX: 7 NG/L
GLOBULIN UR ELPH-MCNC: 4.9 GM/DL
GLUCOSE SERPL-MCNC: 99 MG/DL (ref 65–99)
HCT VFR BLD AUTO: 42.5 % (ref 34–46.6)
HGB BLD-MCNC: 13.6 G/DL (ref 12–15.9)
HOLD SPECIMEN: NORMAL
IMM GRANULOCYTES # BLD AUTO: 0.01 10*3/MM3 (ref 0–0.05)
IMM GRANULOCYTES NFR BLD AUTO: 0.1 % (ref 0–0.5)
LIPASE SERPL-CCNC: 14 U/L (ref 13–60)
LYMPHOCYTES # BLD AUTO: 1.62 10*3/MM3 (ref 0.7–3.1)
LYMPHOCYTES NFR BLD AUTO: 18.3 % (ref 19.6–45.3)
MAGNESIUM SERPL-MCNC: 2.1 MG/DL (ref 1.6–2.4)
MCH RBC QN AUTO: 29.2 PG (ref 26.6–33)
MCHC RBC AUTO-ENTMCNC: 32 G/DL (ref 31.5–35.7)
MCV RBC AUTO: 91.4 FL (ref 79–97)
MONOCYTES # BLD AUTO: 0.81 10*3/MM3 (ref 0.1–0.9)
MONOCYTES NFR BLD AUTO: 9.2 % (ref 5–12)
NEUTROPHILS NFR BLD AUTO: 6.29 10*3/MM3 (ref 1.7–7)
NEUTROPHILS NFR BLD AUTO: 71.2 % (ref 42.7–76)
NT-PROBNP SERPL-MCNC: 105 PG/ML (ref 0–1800)
PLATELET # BLD AUTO: 282 10*3/MM3 (ref 140–450)
PMV BLD AUTO: 9.9 FL (ref 6–12)
POTASSIUM SERPL-SCNC: 3.7 MMOL/L (ref 3.5–5.2)
PROT SERPL-MCNC: 8.6 G/DL (ref 6–8.5)
RBC # BLD AUTO: 4.65 10*6/MM3 (ref 3.77–5.28)
SODIUM SERPL-SCNC: 139 MMOL/L (ref 136–145)
T4 FREE SERPL-MCNC: 1.34 NG/DL (ref 0.92–1.68)
TROPONIN T NUMERIC DELTA: 1 NG/L
TROPONIN T SERPL HS-MCNC: 6 NG/L
TSH SERPL DL<=0.05 MIU/L-ACNC: 0.89 UIU/ML (ref 0.27–4.2)
WBC NRBC COR # BLD AUTO: 8.84 10*3/MM3 (ref 3.4–10.8)
WHOLE BLOOD HOLD COAG: NORMAL
WHOLE BLOOD HOLD SPECIMEN: NORMAL

## 2025-04-09 PROCEDURE — 25510000001 IOPAMIDOL PER 1 ML: Performed by: STUDENT IN AN ORGANIZED HEALTH CARE EDUCATION/TRAINING PROGRAM

## 2025-04-09 PROCEDURE — 25810000003 SODIUM CHLORIDE 0.9 % SOLUTION: Performed by: STUDENT IN AN ORGANIZED HEALTH CARE EDUCATION/TRAINING PROGRAM

## 2025-04-09 PROCEDURE — 99285 EMERGENCY DEPT VISIT HI MDM: CPT | Performed by: FAMILY MEDICINE

## 2025-04-09 PROCEDURE — 71275 CT ANGIOGRAPHY CHEST: CPT

## 2025-04-09 PROCEDURE — G0378 HOSPITAL OBSERVATION PER HR: HCPCS

## 2025-04-09 PROCEDURE — 84439 ASSAY OF FREE THYROXINE: CPT | Performed by: FAMILY MEDICINE

## 2025-04-09 PROCEDURE — 83690 ASSAY OF LIPASE: CPT | Performed by: FAMILY MEDICINE

## 2025-04-09 PROCEDURE — 83735 ASSAY OF MAGNESIUM: CPT | Performed by: FAMILY MEDICINE

## 2025-04-09 PROCEDURE — 82550 ASSAY OF CK (CPK): CPT | Performed by: FAMILY MEDICINE

## 2025-04-09 PROCEDURE — 93005 ELECTROCARDIOGRAM TRACING: CPT | Performed by: FAMILY MEDICINE

## 2025-04-09 PROCEDURE — 83880 ASSAY OF NATRIURETIC PEPTIDE: CPT | Performed by: FAMILY MEDICINE

## 2025-04-09 PROCEDURE — 85379 FIBRIN DEGRADATION QUANT: CPT | Performed by: FAMILY MEDICINE

## 2025-04-09 PROCEDURE — 84484 ASSAY OF TROPONIN QUANT: CPT | Performed by: FAMILY MEDICINE

## 2025-04-09 PROCEDURE — 84443 ASSAY THYROID STIM HORMONE: CPT | Performed by: FAMILY MEDICINE

## 2025-04-09 PROCEDURE — 86140 C-REACTIVE PROTEIN: CPT | Performed by: FAMILY MEDICINE

## 2025-04-09 PROCEDURE — 85025 COMPLETE CBC W/AUTO DIFF WBC: CPT | Performed by: FAMILY MEDICINE

## 2025-04-09 PROCEDURE — 93005 ELECTROCARDIOGRAM TRACING: CPT

## 2025-04-09 PROCEDURE — 80053 COMPREHEN METABOLIC PANEL: CPT | Performed by: FAMILY MEDICINE

## 2025-04-09 PROCEDURE — 71045 X-RAY EXAM CHEST 1 VIEW: CPT

## 2025-04-09 RX ORDER — GABAPENTIN 100 MG/1
100 CAPSULE ORAL 3 TIMES DAILY
Status: DISCONTINUED | OUTPATIENT
Start: 2025-04-10 | End: 2025-04-09

## 2025-04-09 RX ORDER — ATORVASTATIN CALCIUM 10 MG/1
40 TABLET, FILM COATED ORAL NIGHTLY
Status: DISCONTINUED | OUTPATIENT
Start: 2025-04-10 | End: 2025-04-10 | Stop reason: HOSPADM

## 2025-04-09 RX ORDER — SODIUM CHLORIDE 0.9 % (FLUSH) 0.9 %
10 SYRINGE (ML) INJECTION AS NEEDED
Status: DISCONTINUED | OUTPATIENT
Start: 2025-04-09 | End: 2025-04-10 | Stop reason: HOSPADM

## 2025-04-09 RX ORDER — ASPIRIN 81 MG/1
81 TABLET, CHEWABLE ORAL DAILY
Status: DISCONTINUED | OUTPATIENT
Start: 2025-04-10 | End: 2025-04-10 | Stop reason: HOSPADM

## 2025-04-09 RX ORDER — IOPAMIDOL 755 MG/ML
100 INJECTION, SOLUTION INTRAVASCULAR
Status: COMPLETED | OUTPATIENT
Start: 2025-04-09 | End: 2025-04-09

## 2025-04-09 RX ORDER — PANTOPRAZOLE SODIUM 40 MG/1
40 TABLET, DELAYED RELEASE ORAL
Status: DISCONTINUED | OUTPATIENT
Start: 2025-04-10 | End: 2025-04-10 | Stop reason: HOSPADM

## 2025-04-09 RX ORDER — ASPIRIN 81 MG/1
324 TABLET, CHEWABLE ORAL ONCE
Status: DISCONTINUED | OUTPATIENT
Start: 2025-04-09 | End: 2025-04-09

## 2025-04-09 RX ORDER — FAMOTIDINE 20 MG/1
20 TABLET, FILM COATED ORAL 2 TIMES DAILY
Status: DISCONTINUED | OUTPATIENT
Start: 2025-04-10 | End: 2025-04-10 | Stop reason: HOSPADM

## 2025-04-09 RX ORDER — LOSARTAN POTASSIUM 50 MG/1
25 TABLET ORAL DAILY
Status: DISCONTINUED | OUTPATIENT
Start: 2025-04-10 | End: 2025-04-10 | Stop reason: HOSPADM

## 2025-04-09 RX ADMIN — SODIUM CHLORIDE 500 ML: 0.9 INJECTION, SOLUTION INTRAVENOUS at 20:50

## 2025-04-09 RX ADMIN — IOPAMIDOL 50 ML: 755 INJECTION, SOLUTION INTRAVENOUS at 19:50

## 2025-04-09 NOTE — FSED PROVIDER NOTE
Subjective   History of Present Illness  Patient is an 84-year-old female with a history of right-sided breast cancer, osteoarthritis and RA who presents emergency department for chest pain since yesterday.  The patient states that her chest pain started yesterday afternoon unrelated to any activity or event and has persisted throughout the night.  The patient states that the pain is located in the midline of her chest and describes it as an aching sensation.  She is having some associated mild shortness of breath, but denies any nausea, vomiting or recent illness.  She has not had any dizziness or syncopal episodes.  She has no cardiac history.  She denies any additional symptoms at this time.  Note that history was supplemented by her daughter who was at bedside and was difficult to obtain from the patient herself due to her being extremely hard of hearing.        Review of Systems   Constitutional:  Negative for activity change, appetite change, chills, diaphoresis, fatigue and fever.   HENT:  Negative for congestion, nosebleeds, postnasal drip, rhinorrhea, sinus pressure, sinus pain, sneezing and sore throat.    Eyes:  Negative for discharge and itching.   Respiratory:  Positive for chest tightness and shortness of breath. Negative for apnea, cough, choking, wheezing and stridor.    Cardiovascular:  Positive for chest pain. Negative for leg swelling.   Gastrointestinal:  Negative for abdominal distention, abdominal pain, constipation, diarrhea, nausea and vomiting.   Endocrine: Negative for cold intolerance and heat intolerance.   Genitourinary:  Negative for difficulty urinating and dysuria.   Musculoskeletal:  Negative for arthralgias and back pain.   Skin:  Negative for color change and pallor.   Neurological:  Negative for dizziness and headaches.   Psychiatric/Behavioral:  Negative for agitation and confusion.        Past Medical History:   Diagnosis Date    Anxiety     Breast cancer 1979    RIGHT     Elevated lipids     Encounter for long-term (current) use of high-risk medication 06/04/2024    GERD (gastroesophageal reflux disease)     Hyperlipidemia     Insomnia     Obesity     Oral herpes     Osteoarthritis, generalized     Osteopenia     Osteoporosis     RA (rheumatoid arthritis)     Seropositive rheumatoid arthritis     Stomach ulcer        Allergies   Allergen Reactions    Penicillins Hives       Past Surgical History:   Procedure Laterality Date    BREAST BIOPSY Bilateral     DENTAL PROCEDURE  1942    MASTECTOMY  1979    RIGHT    OOPHORECTOMY Bilateral 1969    TOTAL ABDOMINAL HYSTERECTOMY  1969    TOTAL ABDOMINAL HYSTERECTOMY      TOTAL KNEE ARTHROPLASTY  2009    left    TOTAL KNEE ARTHROPLASTY  2011    right       Family History   Problem Relation Age of Onset    Stroke Mother     Heart failure Father     Diabetes Daughter     Mental illness Daughter     Other (fluid retention) Daughter     Mental illness Son     Heart attack Maternal Aunt     Stroke Maternal Grandfather     Colon cancer Grandchild     Breast cancer Neg Hx     Ovarian cancer Neg Hx        Social History     Socioeconomic History    Marital status:    Tobacco Use    Smoking status: Never    Smokeless tobacco: Never   Vaping Use    Vaping status: Never Used   Substance and Sexual Activity    Alcohol use: No    Drug use: No    Sexual activity: Defer           Objective   Physical Exam  Vitals and nursing note reviewed.   Constitutional:       General: She is not in acute distress.     Appearance: She is well-developed. She is not ill-appearing or toxic-appearing.      Comments: Elderly and frail   HENT:      Head: Normocephalic and atraumatic.   Eyes:      Pupils: Pupils are equal, round, and reactive to light.   Neck:      Thyroid: No thyromegaly.      Vascular: No hepatojugular reflux or JVD.      Trachea: No tracheal deviation.   Cardiovascular:      Rate and Rhythm: Regular rhythm. Tachycardia present. No extrasystoles are  present.     Chest Wall: PMI is not displaced.      Heart sounds: Normal heart sounds. Heart sounds not distant. No murmur heard.     No systolic murmur is present.      No diastolic murmur is present.      No friction rub. No gallop. No S3 or S4 sounds.   Pulmonary:      Effort: Pulmonary effort is normal. No tachypnea, accessory muscle usage or respiratory distress.      Breath sounds: Normal breath sounds. No stridor. No decreased breath sounds, wheezing, rhonchi or rales.   Chest:      Chest wall: No mass, deformity, tenderness, crepitus or edema. There is no dullness to percussion.   Abdominal:      General: There is no abdominal bruit.      Palpations: Abdomen is soft. There is no fluid wave, hepatomegaly, splenomegaly or mass.      Tenderness: There is no abdominal tenderness.   Musculoskeletal:      Cervical back: Normal range of motion and neck supple.      Right lower leg: Tenderness present.      Left lower leg: No tenderness. No edema.      Comments: Patient was tender in her right calf muscle with positive Homans' sign   Lymphadenopathy:      Cervical: No cervical adenopathy.   Skin:     General: Skin is warm and dry.      Capillary Refill: Capillary refill takes less than 2 seconds.   Neurological:      General: No focal deficit present.      Mental Status: She is alert.   Psychiatric:         Mood and Affect: Mood normal. Mood is not anxious.         Behavior: Behavior normal. Behavior is not agitated.         Procedures           ED Course  ED Course as of 04/09/25 2230 Wed Apr 09, 2025 1900 Patient is resting comfortably with no complaints at this time.  D-dimer is markedly elevated.  CT PE protocol is pending [EG]   1923 EKG sinus tachycardia with rate of 121 bpm WA intervals 150 ms QRS duration is 76 ms QTc is 462 ms [EG]   1924 C-reactive Protein(!) [EG]   1924 D-Dimer, Quant(!): 4.32 [EG]   1924 Comprehensive Metabolic Panel(!) [EG]   1924 Creatinine(!): 0.53 [EG]   1924 CO2(!): 21.7 [EG]    1924 Total Protein(!): 8.6 [EG]   1924 Hemoglobin: 13.6 [EG]   1924 Hematocrit: 42.5 [EG]   1924 Platelets: 282 [EG]   1924 HS Troponin T: 6 [EG]   1924 proBNP: 105.0 [EG]   1924 Creatine Kinase: 35 [EG]   1924 Lipase: 14 [EG]   1924 Magnesium: 2.1 [EG]   1924 Patient checked out to Dr. Mcgarry at shift change [EG]   2230 Patient significantly D-dimer no evidence of pulm embolism on exam she did appear mildly dehydrated was given 500 cc IV fluid bolus will admit for bilateral duplex ultrasounds tomorrow she also had squeezing chest pain concerning given her age despite negative tropes we will admit her for possible stress test tomorrow to rule out ischemic chest pain. [JN]      ED Course User Index  [EG] Skye Perrin DO  [JN] Tony Mcgarry MD                HEART Score: 5                            Medical Decision Making  Patient is an 84-year-old female who presented to the emergency department today complaining of chest pain with associated shortness of breath.  Upon arrival to the ED she was noted to be tachycardic with heart rate in the 120s.  The patient's rhythm was regular.  Workup was significant for elevated D-dimer at 4.3 so CT PE protocol was ordered.    Problems Addressed:  Bronchiectasis without complication: complicated acute illness or injury  Chest pain, unspecified type: complicated acute illness or injury  Leg pain, bilateral: complicated acute illness or injury  Positive D dimer: complicated acute illness or injury    Amount and/or Complexity of Data Reviewed  Labs: ordered. Decision-making details documented in ED Course.  Radiology: ordered.  ECG/medicine tests: ordered.    Risk  OTC drugs.  Prescription drug management.  Decision regarding hospitalization.        Final diagnoses:   Chest pain, unspecified type   Leg pain, bilateral   Positive D dimer   Bronchiectasis without complication       ED Disposition  ED Disposition       ED Disposition   Decision to Admit    Condition   --     Comment   --               No follow-up provider specified.       Medication List      No changes were made to your prescriptions during this visit.

## 2025-04-10 ENCOUNTER — APPOINTMENT (OUTPATIENT)
Facility: HOSPITAL | Age: 85
End: 2025-04-10
Payer: MEDICARE

## 2025-04-10 VITALS
OXYGEN SATURATION: 96 % | BODY MASS INDEX: 24.18 KG/M2 | DIASTOLIC BLOOD PRESSURE: 62 MMHG | HEART RATE: 91 BPM | RESPIRATION RATE: 18 BRPM | HEIGHT: 62 IN | WEIGHT: 131.39 LBS | TEMPERATURE: 98.5 F | SYSTOLIC BLOOD PRESSURE: 120 MMHG

## 2025-04-10 LAB
ALBUMIN SERPL-MCNC: 3.1 G/DL (ref 3.5–5.2)
ALBUMIN/GLOB SERPL: 0.7 G/DL
ALP SERPL-CCNC: 84 U/L (ref 39–117)
ALT SERPL W P-5'-P-CCNC: 14 U/L (ref 1–33)
ANION GAP SERPL CALCULATED.3IONS-SCNC: 9.1 MMOL/L (ref 5–15)
AST SERPL-CCNC: 25 U/L (ref 1–32)
BH CV ECHO MEAS - AO ROOT DIAM: 2.6 CM
BH CV ECHO MEAS - EDV(CUBED): 50.7 ML
BH CV ECHO MEAS - ESV(CUBED): 12.2 ML
BH CV ECHO MEAS - FS: 37.8 %
BH CV ECHO MEAS - IVS/LVPW: 0.9 CM
BH CV ECHO MEAS - IVSD: 0.9 CM
BH CV ECHO MEAS - LA DIMENSION: 2.6 CM
BH CV ECHO MEAS - LV MASS(C)D: 104.6 GRAMS
BH CV ECHO MEAS - LVIDD: 3.7 CM
BH CV ECHO MEAS - LVIDS: 2.3 CM
BH CV ECHO MEAS - LVOT AREA: 3.1 CM2
BH CV ECHO MEAS - LVOT DIAM: 2 CM
BH CV ECHO MEAS - LVPWD: 1 CM
BH CV LOWER VASCULAR LEFT COMMON FEMORAL AUGMENT: NORMAL
BH CV LOWER VASCULAR LEFT COMMON FEMORAL COMPRESS: NORMAL
BH CV LOWER VASCULAR LEFT COMMON FEMORAL PHASIC: NORMAL
BH CV LOWER VASCULAR LEFT COMMON FEMORAL SPONT: NORMAL
BH CV LOWER VASCULAR LEFT DISTAL FEMORAL AUGMENT: NORMAL
BH CV LOWER VASCULAR LEFT DISTAL FEMORAL COMPRESS: NORMAL
BH CV LOWER VASCULAR LEFT DISTAL FEMORAL PHASIC: NORMAL
BH CV LOWER VASCULAR LEFT DISTAL FEMORAL SPONT: NORMAL
BH CV LOWER VASCULAR LEFT GASTRONEMIUS COMPRESS: NORMAL
BH CV LOWER VASCULAR LEFT GREATER SAPH AK COMPRESS: NORMAL
BH CV LOWER VASCULAR LEFT GREATER SAPH BK COMPRESS: NORMAL
BH CV LOWER VASCULAR LEFT LESSER SAPH COMPRESS: NORMAL
BH CV LOWER VASCULAR LEFT MID FEMORAL AUGMENT: NORMAL
BH CV LOWER VASCULAR LEFT MID FEMORAL COMPRESS: NORMAL
BH CV LOWER VASCULAR LEFT MID FEMORAL PHASIC: NORMAL
BH CV LOWER VASCULAR LEFT MID FEMORAL SPONT: NORMAL
BH CV LOWER VASCULAR LEFT PERONEAL COMPRESS: NORMAL
BH CV LOWER VASCULAR LEFT POPLITEAL AUGMENT: NORMAL
BH CV LOWER VASCULAR LEFT POPLITEAL COMPRESS: NORMAL
BH CV LOWER VASCULAR LEFT POPLITEAL PHASIC: NORMAL
BH CV LOWER VASCULAR LEFT POPLITEAL SPONT: NORMAL
BH CV LOWER VASCULAR LEFT POSTERIOR TIBIAL COMPRESS: NORMAL
BH CV LOWER VASCULAR LEFT PROFUNDA FEMORAL PHASIC: NORMAL
BH CV LOWER VASCULAR LEFT PROFUNDA FEMORAL SPONT: NORMAL
BH CV LOWER VASCULAR LEFT PROXIMAL FEMORAL AUGMENT: NORMAL
BH CV LOWER VASCULAR LEFT PROXIMAL FEMORAL COMPRESS: NORMAL
BH CV LOWER VASCULAR LEFT PROXIMAL FEMORAL PHASIC: NORMAL
BH CV LOWER VASCULAR LEFT PROXIMAL FEMORAL SPONT: NORMAL
BH CV LOWER VASCULAR LEFT SAPHENOFEMORAL JUNCTION AUGMENT: NORMAL
BH CV LOWER VASCULAR LEFT SAPHENOFEMORAL JUNCTION COMPRESS: NORMAL
BH CV LOWER VASCULAR LEFT SAPHENOFEMORAL JUNCTION PHASIC: NORMAL
BH CV LOWER VASCULAR LEFT SAPHENOFEMORAL JUNCTION SPONT: NORMAL
BH CV LOWER VASCULAR RIGHT COMMON FEMORAL AUGMENT: NORMAL
BH CV LOWER VASCULAR RIGHT COMMON FEMORAL COMPRESS: NORMAL
BH CV LOWER VASCULAR RIGHT COMMON FEMORAL PHASIC: NORMAL
BH CV LOWER VASCULAR RIGHT COMMON FEMORAL SPONT: NORMAL
BH CV LOWER VASCULAR RIGHT DISTAL FEMORAL AUGMENT: NORMAL
BH CV LOWER VASCULAR RIGHT DISTAL FEMORAL COMPRESS: NORMAL
BH CV LOWER VASCULAR RIGHT DISTAL FEMORAL PHASIC: NORMAL
BH CV LOWER VASCULAR RIGHT DISTAL FEMORAL SPONT: NORMAL
BH CV LOWER VASCULAR RIGHT GASTRONEMIUS COMPRESS: NORMAL
BH CV LOWER VASCULAR RIGHT GREATER SAPH AK COMPRESS: NORMAL
BH CV LOWER VASCULAR RIGHT GREATER SAPH BK COMPRESS: NORMAL
BH CV LOWER VASCULAR RIGHT LESSER SAPH COMPRESS: NORMAL
BH CV LOWER VASCULAR RIGHT MID FEMORAL AUGMENT: NORMAL
BH CV LOWER VASCULAR RIGHT MID FEMORAL COMPRESS: NORMAL
BH CV LOWER VASCULAR RIGHT MID FEMORAL PHASIC: NORMAL
BH CV LOWER VASCULAR RIGHT MID FEMORAL SPONT: NORMAL
BH CV LOWER VASCULAR RIGHT PERONEAL COMPRESS: NORMAL
BH CV LOWER VASCULAR RIGHT POPLITEAL AUGMENT: NORMAL
BH CV LOWER VASCULAR RIGHT POPLITEAL COMPRESS: NORMAL
BH CV LOWER VASCULAR RIGHT POPLITEAL PHASIC: NORMAL
BH CV LOWER VASCULAR RIGHT POPLITEAL SPONT: NORMAL
BH CV LOWER VASCULAR RIGHT POSTERIOR TIBIAL COMPRESS: NORMAL
BH CV LOWER VASCULAR RIGHT PROFUNDA FEMORAL PHASIC: NORMAL
BH CV LOWER VASCULAR RIGHT PROFUNDA FEMORAL SPONT: NORMAL
BH CV LOWER VASCULAR RIGHT PROXIMAL FEMORAL AUGMENT: NORMAL
BH CV LOWER VASCULAR RIGHT PROXIMAL FEMORAL COMPRESS: NORMAL
BH CV LOWER VASCULAR RIGHT PROXIMAL FEMORAL PHASIC: NORMAL
BH CV LOWER VASCULAR RIGHT PROXIMAL FEMORAL SPONT: NORMAL
BH CV LOWER VASCULAR RIGHT SAPHENOFEMORAL JUNCTION AUGMENT: NORMAL
BH CV LOWER VASCULAR RIGHT SAPHENOFEMORAL JUNCTION COMPRESS: NORMAL
BH CV LOWER VASCULAR RIGHT SAPHENOFEMORAL JUNCTION PHASIC: NORMAL
BH CV LOWER VASCULAR RIGHT SAPHENOFEMORAL JUNCTION SPONT: NORMAL
BH CV STRESS BP STAGE 1: NORMAL
BH CV STRESS DOSE DOBUTAMINE STAGE 1: 10
BH CV STRESS DURATION MIN STAGE 1: 3
BH CV STRESS DURATION SEC STAGE 1: 4
BH CV STRESS HR STAGE 1: 136
BH CV STRESS O2 STAGE 1: 99
BH CV STRESS PROTOCOL 1: NORMAL
BH CV STRESS RATE STAGE 1: 36
BH CV STRESS RECOVERY BP: NORMAL MMHG
BH CV STRESS RECOVERY HR: 111 BPM
BH CV STRESS RECOVERY O2: 100 %
BH CV STRESS STAGE 1: 1
BH CV VAS PRELIMINARY FINDINGS SCRIPTING: 1
BILIRUB SERPL-MCNC: 0.5 MG/DL (ref 0–1.2)
BUN SERPL-MCNC: 9 MG/DL (ref 8–23)
BUN/CREAT SERPL: 18.4 (ref 7–25)
CALCIUM SPEC-SCNC: 8.8 MG/DL (ref 8.6–10.5)
CHLORIDE SERPL-SCNC: 104 MMOL/L (ref 98–107)
CO2 SERPL-SCNC: 21.9 MMOL/L (ref 22–29)
CREAT SERPL-MCNC: 0.49 MG/DL (ref 0.57–1)
DEPRECATED RDW RBC AUTO: 43.8 FL (ref 37–54)
EGFRCR SERPLBLD CKD-EPI 2021: 91.9 ML/MIN/1.73
ERYTHROCYTE [DISTWIDTH] IN BLOOD BY AUTOMATED COUNT: 13 % (ref 12.3–15.4)
GLOBULIN UR ELPH-MCNC: 4.2 GM/DL
GLUCOSE SERPL-MCNC: 123 MG/DL (ref 65–99)
HCT VFR BLD AUTO: 37.2 % (ref 34–46.6)
HGB BLD-MCNC: 12 G/DL (ref 12–15.9)
LV EF 2D ECHO EST: 65 %
MAXIMAL PREDICTED HEART RATE: 134 BPM
MCH RBC QN AUTO: 29.4 PG (ref 26.6–33)
MCHC RBC AUTO-ENTMCNC: 32.3 G/DL (ref 31.5–35.7)
MCV RBC AUTO: 91.2 FL (ref 79–97)
PERCENT MAX PREDICTED HR: 111.94 %
PLATELET # BLD AUTO: 251 10*3/MM3 (ref 140–450)
PMV BLD AUTO: 9.5 FL (ref 6–12)
POTASSIUM SERPL-SCNC: 3.6 MMOL/L (ref 3.5–5.2)
PROT SERPL-MCNC: 7.3 G/DL (ref 6–8.5)
RBC # BLD AUTO: 4.08 10*6/MM3 (ref 3.77–5.28)
SODIUM SERPL-SCNC: 135 MMOL/L (ref 136–145)
STRESS BASELINE BP: NORMAL MMHG
STRESS BASELINE HR: 108 BPM
STRESS O2 SAT REST: 99 %
STRESS PERCENT HR: 132 %
STRESS POST EXERCISE DUR MIN: 3 MIN
STRESS POST EXERCISE DUR SEC: 4 SEC
STRESS POST O2 SAT PEAK: 100 %
STRESS POST PEAK BP: NORMAL MMHG
STRESS POST PEAK HR: 150 BPM
STRESS TARGET HR: 114 BPM
TROPONIN T SERPL HS-MCNC: 10 NG/L
WBC NRBC COR # BLD AUTO: 9.92 10*3/MM3 (ref 3.4–10.8)

## 2025-04-10 PROCEDURE — 93018 CV STRESS TEST I&R ONLY: CPT | Performed by: INTERNAL MEDICINE

## 2025-04-10 PROCEDURE — 93350 STRESS TTE ONLY: CPT | Performed by: INTERNAL MEDICINE

## 2025-04-10 PROCEDURE — 25010000002 SULFUR HEXAFLUORIDE MICROSPH 60.7-25 MG RECONSTITUTED SUSPENSION: Performed by: PHYSICIAN ASSISTANT

## 2025-04-10 PROCEDURE — 84484 ASSAY OF TROPONIN QUANT: CPT | Performed by: PHYSICIAN ASSISTANT

## 2025-04-10 PROCEDURE — 25010000002 DOBUTAMINE PER 250 MG: Performed by: PHYSICIAN ASSISTANT

## 2025-04-10 PROCEDURE — 85027 COMPLETE CBC AUTOMATED: CPT | Performed by: PHYSICIAN ASSISTANT

## 2025-04-10 PROCEDURE — 93350 STRESS TTE ONLY: CPT

## 2025-04-10 PROCEDURE — 93017 CV STRESS TEST TRACING ONLY: CPT

## 2025-04-10 PROCEDURE — 80053 COMPREHEN METABOLIC PANEL: CPT | Performed by: PHYSICIAN ASSISTANT

## 2025-04-10 PROCEDURE — 93970 EXTREMITY STUDY: CPT

## 2025-04-10 PROCEDURE — 93325 DOPPLER ECHO COLOR FLOW MAPG: CPT

## 2025-04-10 PROCEDURE — 93970 EXTREMITY STUDY: CPT | Performed by: INTERNAL MEDICINE

## 2025-04-10 PROCEDURE — 93352 ADMIN ECG CONTRAST AGENT: CPT | Performed by: INTERNAL MEDICINE

## 2025-04-10 PROCEDURE — 93016 CV STRESS TEST SUPVJ ONLY: CPT | Performed by: INTERNAL MEDICINE

## 2025-04-10 PROCEDURE — 93320 DOPPLER ECHO COMPLETE: CPT

## 2025-04-10 PROCEDURE — G0378 HOSPITAL OBSERVATION PER HR: HCPCS

## 2025-04-10 RX ORDER — DOBUTAMINE HYDROCHLORIDE 100 MG/100ML
5 INJECTION INTRAVENOUS
Status: DISCONTINUED | OUTPATIENT
Start: 2025-04-10 | End: 2025-04-10 | Stop reason: HOSPADM

## 2025-04-10 RX ADMIN — PANTOPRAZOLE SODIUM 40 MG: 40 TABLET, DELAYED RELEASE ORAL at 11:53

## 2025-04-10 RX ADMIN — DOBUTAMINE HYDROCHLORIDE 5 MCG/KG/MIN: 100 INJECTION INTRAVENOUS at 10:38

## 2025-04-10 RX ADMIN — ASPIRIN 81 MG: 81 TABLET, CHEWABLE ORAL at 11:52

## 2025-04-10 RX ADMIN — SULFUR HEXAFLUORIDE 5 ML: KIT at 10:45

## 2025-04-10 RX ADMIN — FAMOTIDINE 20 MG: 20 TABLET, FILM COATED ORAL at 11:52

## 2025-04-10 RX ADMIN — ATORVASTATIN CALCIUM 40 MG: 10 TABLET, FILM COATED ORAL at 02:53

## 2025-04-10 NOTE — DISCHARGE SUMMARY
[facility] CDU  TRANSFER SUMMARY      Patient Name: Katherine Marroquin  : 1940  MRN: [id number]    Date of Admission: 2025  Date of Discharge:  04/10/25   Primary Care Physician: Yohan Osullivan MD    Presenting Problem:   Chest pain [R07.9]    Active and Resolved Hospital Problems:  Active Hospital Problems    Diagnosis POA    **Chest pain [R07.9] Yes      Resolved Hospital Problems   No resolved problems to display.     Hospital Course: A 84-year-old female presented to our emergency department yesterday for substernal chest pain that started on Tuesday afternoon.  She described it as an aching pain in her chest.  Patient did state she had some associated shortness of breath but denies a cough.  Patient has no cardiac history and has never had a stress test or heart cath.    Patient was found to have normal troponins and a CT PE protocol that showed nonspecific bronchiectasis at well as lymphadenopathy in her left axilla.  Patient was admitted to our clinical decision unit overnight for a stress test and bilateral lower extremity venous Doppler ultrasounds this morning.  No acute findings on the stress test were noted.  Patient's EF was found to be 65%.  Patient was informed of her incidental findings of left axillary lymphadenopathy, she was advised to follow-up for an outpatient ultrasound and further evaluation.    Nurses Notes reviewed and agree, including vitals, allergies, social history and prior medical history.     REVIEW OF SYSTEMS: All systems reviewed and not pertinent unless noted.  Review of Systems   Constitutional:  Negative for chills and fever.   HENT:  Negative for ear pain and sore throat.    Respiratory:  Negative for cough and shortness of breath.    Cardiovascular:  Positive for chest pain.        (Resolved)   Gastrointestinal:  Negative for diarrhea, nausea and vomiting.   Genitourinary:  Negative for dysuria and frequency.   Musculoskeletal:  Negative for back pain  "and neck pain.   Neurological:  Negative for headaches.     Past Medical History:   Diagnosis Date    Anxiety     Breast cancer 1979    RIGHT    Elevated lipids     Encounter for long-term (current) use of high-risk medication 06/04/2024    GERD (gastroesophageal reflux disease)     Hyperlipidemia     Insomnia     Obesity     Oral herpes     Osteoarthritis, generalized     Osteopenia     Osteoporosis     RA (rheumatoid arthritis)     Seropositive rheumatoid arthritis     Stomach ulcer      Allergies:    Penicillins    Past Surgical History:   Procedure Laterality Date    BREAST BIOPSY Bilateral     DENTAL PROCEDURE  1942    MASTECTOMY  1979    RIGHT    OOPHORECTOMY Bilateral 1969    TOTAL ABDOMINAL HYSTERECTOMY  1969    TOTAL ABDOMINAL HYSTERECTOMY      TOTAL KNEE ARTHROPLASTY  2009    left    TOTAL KNEE ARTHROPLASTY  2011    right     Social History     Socioeconomic History    Marital status:    Tobacco Use    Smoking status: Never    Smokeless tobacco: Never   Vaping Use    Vaping status: Never Used   Substance and Sexual Activity    Alcohol use: No    Drug use: No    Sexual activity: Defer     Family History   Problem Relation Age of Onset    Stroke Mother     Heart failure Father     Diabetes Daughter     Mental illness Daughter     Other (fluid retention) Daughter     Mental illness Son     Heart attack Maternal Aunt     Stroke Maternal Grandfather     Colon cancer Grandchild     Breast cancer Neg Hx     Ovarian cancer Neg Hx      Objective  Physical Exam:  /62 (BP Location: Left arm, Patient Position: Lying)   Pulse 91   Temp 98.5 °F (36.9 °C) (Oral)   Resp 18   Ht 157.5 cm (62.01\")   Wt 59.6 kg (131 lb 6.3 oz)   SpO2 96%   BMI 24.03 kg/m²      Physical Exam  Vitals and nursing note reviewed.   HENT:      Head: Normocephalic and atraumatic.   Eyes:      Extraocular Movements: Extraocular movements intact.      Pupils: Pupils are equal, round, and reactive to light.   Cardiovascular:      " Rate and Rhythm: Normal rate and regular rhythm.      Pulses: Normal pulses.   Pulmonary:      Effort: Pulmonary effort is normal.      Breath sounds: Normal breath sounds.   Abdominal:      General: Abdomen is flat. Bowel sounds are normal.      Palpations: Abdomen is soft.   Musculoskeletal:         General: Normal range of motion.      Cervical back: Normal range of motion.   Skin:     General: Skin is warm and dry.   Neurological:      General: No focal deficit present.      Mental Status: She is alert and oriented to person, place, and time.   Psychiatric:         Mood and Affect: Mood normal.         Behavior: Behavior normal.       Procedures:none    Adult Stress Echo W/ Cont or Stress Agent if Necessary Per Protocol  Result Date: 4/10/2025    Normal dobutamine stress echo with no ECG or echocardiographic evidence for myocardial ischemia.   Left ventricular systolic function is normal. Estimated left ventricular EF = 65%     Duplex Venous Lower Extremity - Bilateral CAR  Result Date: 4/10/2025    Normal bilateral lower extremity venous duplex scan.     CT Angiogram Chest Pulmonary Embolism  Result Date: 4/9/2025  CT ANGIOGRAM CHEST PULMONARY EMBOLISM Date of Exam: 4/9/2025 7:40 PM EDT Indication: Elevated D-dimer. Comparison: Portable chest radiograph 4/9/2025, chest CT without contrast 6 /20/2018 Technique: Axial CT images were obtained of the chest after the uneventful intravenous administration of Isovue contrast utilizing pulmonary embolism protocol.  In addition, a 3-D volume rendered image was created for interpretation.  Reconstructed coronal and sagittal images were also obtained. Automated exposure control and iterative construction methods were used. Findings: Heart is enlarged. Mild coronary artery calcification. Aortic valve calcification noted without aortic aneurysm. Negative for pericardial effusion. The pulmonary arteries are well-opacified with contrast. Negative for pulmonary embolus. No  mediastinal adenopathy or hilar adenopathy. There are several left axillary lymph nodes which are mildly enlarged nonspecific for example measuring 13 mm in short axis (4/19, 900/45). Trachea and mainstem bronchi are patent. There is no pneumothorax. Mild to tree-in-bud nodules involving the anterior right upper lobe which are chronic and unchanged from prior CT. There is bronchiectasis involving the medial basilar right lower lobe and the right middle lobe which also appears similar to prior study. No new consolidation or findings to indicate pneumonia. The upper abdomen demonstrates normal visualized portions of the liver, spleen, and pancreas. Mild thickening of the adrenal glands bilaterally appears similar to prior study. Kidneys are without hydronephrosis. Celiac artery and superior mesenteric artery patent proximally. No aggressive osseous lesion or acute fracture. Exaggerated thoracic kyphosis.     Impression: Impression: 1. Negative for pulmonary embolus. 2. No acute intrathoracic process. 3. Cardiomegaly and aortic valve calcifications. 4. Areas of bronchiectasis involving the right middle lobe and right lower lobe with chronic tree-in-bud nodules in the anterior right upper lobe, unchanged. 5. Mildly enlarged left axillary lymph nodes appear new from prior CT and are nonspecific, consider follow-up diagnostic mammogram and left axillary ultrasound. Electronically Signed: Marco Gil MD  4/9/2025 8:06 PM EDT  Workstation ID: [id number]    XR Chest 1 View  Result Date: 4/9/2025  XR CHEST 1 VW Date of Exam: 4/9/2025 5:56 PM EDT Indication: Chest Pain Triage Protocol Comparison: Chest radiograph 5/26/2023 Findings: Mediastinum: Cardiac silhouette appears unchanged and normal in size Lungs: The lungs appear clear without focal consolidation appreciated. Pleura: No pleural effusion or pneumothorax. Bones and soft tissues: No acute, displaced fracture seen.     Impression: Impression: No radiographic evidence of  acute cardiopulmonary abnormality. Electronically Signed: Ismael Keating  4/9/2025 6:22 PM EDT  Workstation ID: [id number]       Results from last 7 days   Lab Units 04/10/25  0449 04/09/25  1934 04/09/25  1807   CK TOTAL U/L  --   --  35   HSTROP T ng/L 10 7 6     Results from last 7 days   Lab Units 04/10/25  0449 04/09/25  1807   WBC 10*3/mm3 9.92 8.84   HEMOGLOBIN g/dL 12.0 13.6   HEMATOCRIT % 37.2 42.5   PLATELETS 10*3/mm3 251 282       Results from last 7 days   Lab Units 04/10/25  0449 04/09/25  1807   SODIUM mmol/L 135* 139   POTASSIUM mmol/L 3.6 3.7   CHLORIDE mmol/L 104 103   CO2 mmol/L 21.9* 21.7*   BUN mg/dL 9 10   CREATININE mg/dL 0.49* 0.53*   CALCIUM mg/dL 8.8 9.6   BILIRUBIN mg/dL 0.5 0.5   ALK PHOS U/L 84 101   ALT (SGPT) U/L 14 19   AST (SGOT) U/L 25 33*   GLUCOSE mg/dL 123* 99     MDM: I assumed care of this patient at 7 AM today.  Stress test revealed no evidence of ischemia.  EF was found to be 65%.  Patient resting comfortably on the bed, in no acute distress, eating a sandwich.    A referral was sent to cardiology for outpatient follow-up.      I did inform the daughter and patient of the incidental finding of left axillary lymphadenopathy. I recommended further evaluation outpatient including possible mammogram of the left breast and ultrasound of left axilla.  Patient and patient's daughter have no further questions or concerns at discharge.     Discharge diagnoses: Chest pain    Discharge Medication List:      Your medication list        CONTINUE taking these medications        Instructions Last Dose Given Next Dose Due   alendronate 70 MG tablet  Commonly known as: FOSAMAX      Take 1 tablet by mouth Every 7 (Seven) Days.       aspirin 81 MG EC tablet      Take 1 tablet by mouth Daily.       clobetasol 0.05 % ointment  Commonly known as: TEMOVATE      APPLY A THIN LAYER TO LOWER LEGS TWICE DAILY FOR TWO WEEKS TAKE A WEEK BREAK THEN REPEAT       famotidine 20 MG tablet  Commonly known as:  PEPCID      Take 1 tablet by mouth 2 (Two) Times a Day.       folic acid 1 MG tablet  Commonly known as: FOLVITE      Take 1 tablet by mouth Daily.       losartan 25 MG tablet  Commonly known as: COZAAR      TAKE 1/2 TABLET BY MOUTH DAILY IN THE MORNING FOR HIGH BLOOD PRESSURE       meloxicam 7.5 MG tablet  Commonly known as: MOBIC      Take 1 tablet by mouth Daily As Needed for Mild Pain.       methotrexate 2.5 MG tablet      Take 8 tablets by mouth 1 (One) Time Per Week. SAME DAY EACH WK       omeprazole 20 MG capsule  Commonly known as: priLOSEC      Take 1 capsule by mouth Daily.       ProAir RespiClick 108 (90 Base) MCG/ACT inhaler  Generic drug: albuterol      As Needed.       Qvar RediHaler 40 MCG/ACT inhaler  Generic drug: Beclomethasone Diprop HFA      2 (Two) Times a Day.       rosuvastatin 20 MG tablet  Commonly known as: CRESTOR      Take 1 tablet by mouth Daily.       vitamin B-12 1000 MCG tablet  Commonly known as: CYANOCOBALAMIN      Take 1 tablet by mouth Daily.       VITAMIN D (ERGOCALCIFEROL) PO      Take 1 capsule by mouth 1 (One) Time Per Week. FOR 12 WEEKS AS DIRECTED               Virginia Bell PA-C   04/10/25   18:44 EDT     Time Spent on Discharge:  I spent  30  minutes on this discharge activity which included: face-to-face encounter with the patient, reviewing the data in the system, coordination of the care with the nursing staff as well as consultants, documentation, and entering orders.

## 2025-04-10 NOTE — H&P
[facility]   HISTORY AND PHYSICAL    Patient Name: Katherine Marroquin  : 1940  MRN: [id number]  Primary Care Physician:  Yohan Osullivan MD  Date of admission: 2025    Subjective   Subjective     Chief Complaint:     Chest Pain     Patient is an 84-year-old female with a past medical history of anxiety, right sided breast cancer, osteoarthritis, RA, GERD, high cholesterol who presented to the emergency department tonight for chest pain that had been ongoing for the last 24 hours.  She states that the chest pain started the day prior, there was no inciting activity or event and has continued throughout the evening.  Also complains of bilateral leg pain that has been ongoing for a long period of time.  Patient is not on anticoagulation.  Patient reports that the pain is in the middle of her chest and describes it as a dull ache.  Patient states that she has some mild shortness of breath, but denies other symptoms.  Patient denies nausea, vomiting, abdominal pain, recent illness, fever, chills, headache, radiation of pain, numbness, tingling, loss of sensation.  Patient is very hard of hearing, and daughter is at bedside to help provide history information.    In the emergency department patient had an evaluation performed for her chest pain and was found to have normal troponin, but elevated dimer.  The patient had a CT scan of her chest that showed some nonspecific bronchiectasis, but no PE.  Due to patient's significant past medical history and not having a stress test since , the decision was made to place her in the CDU for observation to have a stress test and DVT ultrasounds of bilateral lower extremities.      Review of Systems   Cardiovascular:  Positive for chest pain.        Personal History     Past Medical History:   Diagnosis Date    Anxiety     Breast cancer     RIGHT    Elevated lipids     Encounter for long-term (current) use of high-risk medication 2024    GERD  (gastroesophageal reflux disease)     Hyperlipidemia     Insomnia     Obesity     Oral herpes     Osteoarthritis, generalized     Osteopenia     Osteoporosis     RA (rheumatoid arthritis)     Seropositive rheumatoid arthritis     Stomach ulcer        Past Surgical History:   Procedure Laterality Date    BREAST BIOPSY Bilateral     DENTAL PROCEDURE  1942    MASTECTOMY  1979    RIGHT    OOPHORECTOMY Bilateral 1969    TOTAL ABDOMINAL HYSTERECTOMY  1969    TOTAL ABDOMINAL HYSTERECTOMY      TOTAL KNEE ARTHROPLASTY  2009    left    TOTAL KNEE ARTHROPLASTY  2011    right       Family History: family history includes Colon cancer in her grandchild; Diabetes in her daughter; Heart attack in her maternal aunt; Heart failure in her father; Mental illness in her daughter and son; Stroke in her maternal grandfather and mother; fluid retention in her daughter. Otherwise pertinent FHx was reviewed and not pertinent to current issue.    Social History:  reports that she has never smoked. She has never used smokeless tobacco. She reports that she does not drink alcohol and does not use drugs.    Home Medications:  Beclomethasone Diprop HFA, Ergocalciferol, albuterol, alendronate, aspirin, clobetasol, famotidine, folic acid, losartan, meloxicam, methotrexate, omeprazole, rosuvastatin, and vitamin B-12    Allergies:  Allergies   Allergen Reactions    Penicillins Hives       Objective    Objective     Vitals:   Temp:  [98 °F (36.7 °C)-98.5 °F (36.9 °C)] 98.5 °F (36.9 °C)  Heart Rate:  [] 94  Resp:  [20-21] 20  BP: (122-165)/(66-94) 122/69    Physical Exam  Vitals and nursing note reviewed.   Constitutional:       Appearance: She is normal weight.      Comments: Hard of hearing     Eyes:      Pupils: Pupils are equal, round, and reactive to light.   Cardiovascular:      Rate and Rhythm: Normal rate and regular rhythm.   Pulmonary:      Effort: Pulmonary effort is normal.      Breath sounds: Normal breath sounds.   Abdominal:       General: Abdomen is flat.      Palpations: Abdomen is soft.      Tenderness: There is no abdominal tenderness.   Musculoskeletal:         General: Tenderness present.      Comments: Tenderness to BL LE, no edema noted   Skin:     General: Skin is warm.   Neurological:      Mental Status: She is alert. Mental status is at baseline.   Psychiatric:         Mood and Affect: Mood normal.         Result Review    Result Review:  I have personally reviewed the results from the time of this admission to 4/10/2025 02:39 EDT and agree with these findings:  [x]  Laboratory list / accordion  []  Microbiology  [x]  Radiology  [x]  EKG/Telemetry   []  Cardiology/Vascular   []  Pathology  [x]  Old records  []  Other:  Most notable findings include: Elevated D-dimer and elevated CRP    WBC   Date Value Ref Range Status   04/09/2025 8.84 3.40 - 10.80 10*3/mm3 Final     RBC   Date Value Ref Range Status   04/09/2025 4.65 3.77 - 5.28 10*6/mm3 Final     Hemoglobin   Date Value Ref Range Status   04/09/2025 13.6 12.0 - 15.9 g/dL Final     Hematocrit   Date Value Ref Range Status   04/09/2025 42.5 34.0 - 46.6 % Final     MCV   Date Value Ref Range Status   04/09/2025 91.4 79.0 - 97.0 fL Final     MCH   Date Value Ref Range Status   04/09/2025 29.2 26.6 - 33.0 pg Final     MCHC   Date Value Ref Range Status   04/09/2025 32.0 31.5 - 35.7 g/dL Final     RDW   Date Value Ref Range Status   04/09/2025 12.9 12.3 - 15.4 % Final     RDW-SD   Date Value Ref Range Status   04/09/2025 43.7 37.0 - 54.0 fl Final     MPV   Date Value Ref Range Status   04/09/2025 9.9 6.0 - 12.0 fL Final     Platelets   Date Value Ref Range Status   04/09/2025 282 140 - 450 10*3/mm3 Final     Neutrophil %   Date Value Ref Range Status   04/09/2025 71.2 42.7 - 76.0 % Final     Lymphocyte %   Date Value Ref Range Status   04/09/2025 18.3 (L) 19.6 - 45.3 % Final     Monocyte %   Date Value Ref Range Status   04/09/2025 9.2 5.0 - 12.0 % Final     Eosinophil %   Date Value  Ref Range Status   04/09/2025 1.0 0.3 - 6.2 % Final     Basophil %   Date Value Ref Range Status   04/09/2025 0.2 0.0 - 1.5 % Final     Immature Grans %   Date Value Ref Range Status   04/09/2025 0.1 0.0 - 0.5 % Final     Neutrophils, Absolute   Date Value Ref Range Status   04/09/2025 6.29 1.70 - 7.00 10*3/mm3 Final     Lymphocytes, Absolute   Date Value Ref Range Status   04/09/2025 1.62 0.70 - 3.10 10*3/mm3 Final     Monocytes, Absolute   Date Value Ref Range Status   04/09/2025 0.81 0.10 - 0.90 10*3/mm3 Final     Eosinophils, Absolute   Date Value Ref Range Status   04/09/2025 0.09 0.00 - 0.40 10*3/mm3 Final     Basophils, Absolute   Date Value Ref Range Status   04/09/2025 0.02 0.00 - 0.20 10*3/mm3 Final     Immature Grans, Absolute   Date Value Ref Range Status   04/09/2025 0.01 0.00 - 0.05 10*3/mm3 Final     Lab Results   Component Value Date    GLUCOSE 99 04/09/2025    BUN 10 04/09/2025    CREATININE 0.53 (L) 04/09/2025     04/09/2025    K 3.7 04/09/2025     04/09/2025    CALCIUM 9.6 04/09/2025    PROTEINTOT 8.6 (H) 04/09/2025    ALBUMIN 3.7 04/09/2025    ALT 19 04/09/2025    AST 33 (H) 04/09/2025    ALKPHOS 101 04/09/2025    BILITOT 0.5 04/09/2025    GLOB 4.9 04/09/2025    AGRATIO 0.8 04/09/2025    BCR 18.9 04/09/2025    ANIONGAP 14.3 04/09/2025    EGFR 90.2 04/09/2025     CT Angiogram Chest Pulmonary Embolism  Result Date: 4/9/2025  Impression: 1. Negative for pulmonary embolus. 2. No acute intrathoracic process. 3. Cardiomegaly and aortic valve calcifications. 4. Areas of bronchiectasis involving the right middle lobe and right lower lobe with chronic tree-in-bud nodules in the anterior right upper lobe, unchanged. 5. Mildly enlarged left axillary lymph nodes appear new from prior CT and are nonspecific, consider follow-up diagnostic mammogram and left axillary ultrasound. Electronically Signed: Marco Gil MD  4/9/2025 8:06 PM EDT  Workstation ID: [id number]    XR Chest 1 View  Result Date:  4/9/2025  Impression: No radiographic evidence of acute cardiopulmonary abnormality. Electronically Signed: Ismael Keating  4/9/2025 6:22 PM EDT  Workstation ID: [id number]    CARDIAC LABS:      Lab 04/09/25  1934 04/09/25  1807   PROBNP  --  105.0   HSTROP T 7 6           Assessment & Plan   Assessment / Plan     Brief Patient Summary:  Katherine Marroquin is a 84 y.o. female who was placed in the CDU for observation of chest pain and elevated dimer.  Patient arrived to the CDU in no acute distress with normal vital signs.  Patient is not currently complaining of chest pain, but reports that she has tenderness to bilateral lower extremities.  Patient's stress test in 2018 was reviewed and noted to have no abnormal findings at that time.  Patient has not had a cardiac evaluation since that time.  Patient is full code, this was discussed with patient and her daughter at bedside.  Patient is currently resting comfortably and will be monitored    Active Hospital Problems:  Active Hospital Problems    Diagnosis     **Chest pain      Plan:     # Chest pain  - Continuous cardiac and pulse oximetry monitoring  - Repeat morning labs with troponin  - Standing EKG and troponin ordered for acute chest pain  - Stress test ordered for the a.m.  - Aspirin given and will continue as prescribed    # Elevated dimer/lower extremity tenderness  - Bilateral DVT ultrasounds ordered    # Hypertension/high cholesterol  -Daily losartan and statin ordered    #GERD  -Protonix continue    VTE Prophylaxis:  Mechanical VTE prophylaxis orders are present.    CDU observation started at 2233 on 4/10/2025     CODE STATUS:    Code Status (Patient has no pulse and is not breathing): CPR (Attempt to Resuscitate)  Medical Interventions (Patient has pulse or is breathing): Full Support  Level Of Support Discussed With: Patient    Admission Status:  I believe this patient meets observation in the CDU status.    Virginia Gill PA-C  02:38 EDT  04/09/2025

## 2025-04-10 NOTE — ED NOTES
Katherine Marroquin    Nursing Report ED to Floor:  Mental status: alert and oriented x4  Ambulatory status: ambulated at home with walker  Oxygen Therapy:  room air   Cardiac Rhythm: sinus tach  Admitted from:  [facility]  Safety Concerns:  fall risk   Precautions: NA  Social Issues: pt has hearing loss, wears hearing aids  ED Room #:  5    ED Nurse Phone Extension - [phone] or may call [phone].      HPI:   Chief Complaint   Patient presents with    Chest Pain       Past Medical History:  Past Medical History:   Diagnosis Date    Anxiety     Breast cancer 1979    RIGHT    Elevated lipids     Encounter for long-term (current) use of high-risk medication 06/04/2024    GERD (gastroesophageal reflux disease)     Hyperlipidemia     Insomnia     Obesity     Oral herpes     Osteoarthritis, generalized     Osteopenia     Osteoporosis     RA (rheumatoid arthritis)     Seropositive rheumatoid arthritis     Stomach ulcer         Past Surgical History:  Past Surgical History:   Procedure Laterality Date    BREAST BIOPSY Bilateral     DENTAL PROCEDURE  1942    MASTECTOMY  1979    RIGHT    OOPHORECTOMY Bilateral 1969    TOTAL ABDOMINAL HYSTERECTOMY  1969    TOTAL ABDOMINAL HYSTERECTOMY      TOTAL KNEE ARTHROPLASTY  2009    left    TOTAL KNEE ARTHROPLASTY  2011    right        Admitting Doctor:   Tony Mcgarry MD    Consulting Provider(s):  Consults       No orders found from 3/11/2025 to 4/10/2025.             Admitting Diagnosis:   The primary encounter diagnosis was Chest pain, unspecified type. Diagnoses of Leg pain, bilateral, Positive D dimer, and Bronchiectasis without complication were also pertinent to this visit.    Most Recent Vitals:   Vitals:    04/09/25 2030 04/09/25 2100 04/09/25 2130 04/09/25 2200   BP: 148/66 165/91 140/71 147/69   Pulse: 108 118 108 101   Resp:       Temp:       TempSrc:       SpO2: 99% 100% 99% 100%   Weight:       Height:           Active LDAs/IV Access:   Lines, Drains & Airways        Active LDAs       Name Placement date Placement time Site Days    Peripheral IV 04/09/25 1816 Anterior;Left Forearm 04/09/25 1816  Forearm  less than 1    Peripheral IV 04/09/25 1934 Anterior;Distal;Right;Upper Arm 04/09/25 1934  Arm  less than 1    External Urinary Catheter 04/09/25 2102  --  less than 1                    Labs (abnormal labs have a star):   Labs Reviewed   COMPREHENSIVE METABOLIC PANEL - Abnormal; Notable for the following components:       Result Value    Creatinine 0.53 (*)     CO2 21.7 (*)     Total Protein 8.6 (*)     AST (SGOT) 33 (*)     All other components within normal limits    Narrative:     GFR Categories in Chronic Kidney Disease (CKD)      GFR Category          GFR (mL/min/1.73)    Interpretation  G1                     90 or greater         Normal or high (1)  G2                      60-89                Mild decrease (1)  G3a                   45-59                Mild to moderate decrease  G3b                   30-44                Moderate to severe decrease  G4                    15-29                Severe decrease  G5                    14 or less           Kidney failure          (1)In the absence of evidence of kidney disease, neither GFR category G1 or G2 fulfill the criteria for CKD.    eGFR calculation 2021 CKD-EPI creatinine equation, which does not include race as a factor   CBC WITH AUTO DIFFERENTIAL - Abnormal; Notable for the following components:    Lymphocyte % 18.3 (*)     All other components within normal limits   D-DIMER, QUANTITATIVE - Abnormal; Notable for the following components:    D-Dimer, Quantitative 4.32 (*)     All other components within normal limits    Narrative:     According to the assay 's published package insert, a normal (<0.50 MCGFEU/mL) D-dimer result in conjunction with a non-high clinical probability assessment, excludes deep vein thrombosis (DVT) and pulmonary embolism (PE) with high sensitivity.    D-dimer values increase  "with age and this can make VTE exclusion of an older population difficult. To address this, the American College of [employer], based on best available evidence and recent guidelines, recommends that clinicians use age-adjusted D-dimer thresholds in patients greater than 50 years of age with: a) a low probability of PE who do not meet all Pulmonary Embolism Rule Out Criteria, or b) in those with intermediate probability of PE.   The formula for an age-adjusted D-dimer cut-off is \"age/100\".  For example, a 60 year old patient would have an age-adjusted cut-off of 0.60 MCGFEU/mL and an 80 year old 0.80 MCGFEU/mL.   C-REACTIVE PROTEIN - Abnormal; Notable for the following components:    C-Reactive Protein 1.76 (*)     All other components within normal limits   TROPONIN - Normal   LIPASE - Normal   BNP (IN-HOUSE) - Normal    Narrative:     This assay is used as an aid in the diagnosis of individuals suspected of having heart failure. It can be used as an aid in the diagnosis of acute decompensated heart failure (ADHF) in patients presenting with signs and symptoms of ADHF to the emergency department (ED). In addition, NT-proBNP of <300 pg/mL indicates ADHF is not likely.    Age Range Result Interpretation  NT-proBNP Concentration (pg/mL:      <50             Positive            >450                   Gray                 300-450                    Negative             <300    50-75           Positive            >900                  Gray                300-900                  Negative            <300      >75             Positive            >1800                  Gray                300-1800                  Negative            <300   TSH - Normal   T4, FREE - Normal   MAGNESIUM - Normal   CK - Normal   HIGH SENSITIVITIY TROPONIN T 1HR - Normal    Narrative:     High Sensitive Troponin T Reference Range:  <14.0 ng/L- Negative Female for AMI  <22.0 ng/L- Negative Male for AMI  >=14 - Abnormal Female indicating possible " myocardial injury.  >=22 - Abnormal Male indicating possible myocardial injury.   Clinicians would have to utilize clinical acumen, EKG, Troponin, and serial changes to determine if it is an Acute Myocardial Infarction or myocardial injury due to an underlying chronic condition.        RAINBOW DRAW    Narrative:     The following orders were created for panel order [place] Draw.  Procedure                               Abnormality         Status                     ---------                               -----------         ------                     Green Top (Gel)[[id number]]                                  Final result               Lavender Top[[id number]]                                     Final result               Gold Top - SST[[id number]]                                   Final result               [place] Top[[id number]]                                         Final result               Light Blue Top[[id number]]                                   Final result                 Please view results for these tests on the individual orders.   CBC AND DIFFERENTIAL    Narrative:     The following orders were created for panel order CBC & Differential.  Procedure                               Abnormality         Status                     ---------                               -----------         ------                     CBC Auto Differential[[id number]]        Abnormal            Final result                 Please view results for these tests on the individual orders.   GREEN TOP   LAVENDER TOP   GOLD TOP - SST   GRAY TOP   LIGHT BLUE TOP       Meds Given in ED:   Medications   sodium chloride 0.9 % flush 10 mL (has no administration in time range)   aspirin chewable tablet 324 mg (324 mg Oral Not Given 4/9/25 1813)   iopamidol (ISOVUE-370) 76 % injection 100 mL (50 mL Intravenous Given 4/9/25 1950)   sodium chloride 0.9 % bolus 500 mL (500 mL Intravenous New Bag 4/9/25 2050)           Last NIH score:                                                           Dysphagia screening results:        [place] Coma Scale:  No data recorded     CIWA:        Restraint Type:            Isolation Status:  No active isolations

## 2025-04-10 NOTE — NURSING NOTE
Patient discharged home via wheelchair with her daughter. Instructions provided to the daughter and to make sure to follow up with the PCP, verbalized understanding.

## 2025-04-12 LAB
QT INTERVAL: 326 MS
QT INTERVAL: 326 MS
QTC INTERVAL: 435 MS
QTC INTERVAL: 462 MS

## 2025-04-14 ENCOUNTER — TRANSCRIBE ORDERS (OUTPATIENT)
Dept: ADMINISTRATIVE | Facility: HOSPITAL | Age: 85
End: 2025-04-14
Payer: MEDICARE

## 2025-04-14 DIAGNOSIS — L03.122 ACUTE LYMPHANGITIS OF LEFT AXILLA: Primary | ICD-10-CM

## 2025-04-23 ENCOUNTER — OFFICE VISIT (OUTPATIENT)
Age: 85
End: 2025-04-23
Payer: MEDICARE

## 2025-04-23 VITALS
HEART RATE: 102 BPM | TEMPERATURE: 97.6 F | BODY MASS INDEX: 24.2 KG/M2 | HEIGHT: 62 IN | DIASTOLIC BLOOD PRESSURE: 78 MMHG | SYSTOLIC BLOOD PRESSURE: 138 MMHG | WEIGHT: 131.5 LBS

## 2025-04-23 DIAGNOSIS — M05.79 RHEUMATOID ARTHRITIS INVOLVING MULTIPLE SITES WITH POSITIVE RHEUMATOID FACTOR: ICD-10-CM

## 2025-04-23 DIAGNOSIS — M81.0 AGE-RELATED OSTEOPOROSIS WITHOUT CURRENT PATHOLOGICAL FRACTURE: ICD-10-CM

## 2025-04-23 RX ORDER — MELOXICAM 7.5 MG/1
7.5 TABLET ORAL DAILY PRN
Qty: 30 TABLET | Refills: 5 | Status: SHIPPED | OUTPATIENT
Start: 2025-04-23

## 2025-04-23 RX ORDER — METHOTREXATE 2.5 MG/1
20 TABLET ORAL WEEKLY
Qty: 96 TABLET | Refills: 0 | Status: SHIPPED | OUTPATIENT
Start: 2025-04-23

## 2025-04-23 RX ORDER — ALENDRONATE SODIUM 70 MG/1
70 TABLET ORAL
Qty: 4 TABLET | Refills: 5 | Status: SHIPPED | OUTPATIENT
Start: 2025-04-23 | End: 2025-04-23 | Stop reason: SDUPTHER

## 2025-04-23 RX ORDER — MULTIPLE VITAMINS W/ MINERALS TAB 9MG-400MCG
1 TAB ORAL DAILY
COMMUNITY

## 2025-04-23 RX ORDER — ALENDRONATE SODIUM 70 MG/1
70 TABLET ORAL
Qty: 4 TABLET | Refills: 5 | Status: SHIPPED | OUTPATIENT
Start: 2025-04-23

## 2025-04-23 RX ORDER — FOLIC ACID 1 MG/1
1 TABLET ORAL DAILY
Qty: 90 TABLET | Refills: 1 | Status: SHIPPED | OUTPATIENT
Start: 2025-04-23

## 2025-04-23 NOTE — PROGRESS NOTES
Office Follow Up      Date: 04/23/2025   Patient Name: Katherine Marroquin  MRN: [id number]  YOB: 1940    Referring Physician: No ref. provider found     Chief Complaint:   Chief Complaint   Patient presents with    Rheumatoid Arthritis       History of Present Illness: Katherine Marroquin is a 84 y.o. female who is here today for follow up on rheumatoid arthritis.      She has been off her methotrexate many weeks as there was concern this might be contributing to her fatigue.  Increased pain bilateral elbows.  She was recently released from the hospital.      Currently we are prescribing her methotrexate 20 mg weekly, meloxicam 7.5 mg daily as needed, folic acid 1 mg daily alendronate 70 mg weekly.     History of Present Illness        Subjective       Review of Systems: Review of Systems   Constitutional:  Positive for activity change, appetite change, fatigue and unexpected weight loss. Negative for chills and fever.   HENT:  Positive for tinnitus. Negative for mouth sores, sinus pressure and sore throat.    Eyes:  Positive for blurred vision. Negative for pain and redness.   Respiratory:  Positive for shortness of breath. Negative for cough.    Cardiovascular:  Positive for chest pain, palpitations and leg swelling.   Gastrointestinal:  Negative for abdominal pain, blood in stool, diarrhea, nausea, vomiting and GERD.   Endocrine: Negative for polydipsia and polyuria.   Genitourinary:  Negative for dysuria, genital sores and hematuria.   Musculoskeletal:  Positive for arthralgias, joint swelling, neck pain and neck stiffness. Negative for back pain and myalgias.   Skin:  Positive for color change. Negative for rash and bruise.   Allergic/Immunologic: Positive for environmental allergies.   Neurological:  Negative for seizures, weakness, numbness and memory problem.   Hematological:  Positive for adenopathy. Does not bruise/bleed easily.   Psychiatric/Behavioral:  Negative for depressed  mood. The patient is not nervous/anxious.         Past Medical History:   Past Medical History:   Diagnosis Date    Anxiety     Breast cancer 1979    RIGHT    Elevated lipids     Encounter for long-term (current) use of high-risk medication 06/04/2024    GERD (gastroesophageal reflux disease)     Hyperlipidemia     Insomnia     Obesity     Oral herpes     Osteoarthritis, generalized     Osteopenia     Osteoporosis     RA (rheumatoid arthritis)     Seropositive rheumatoid arthritis     Stomach ulcer        Past Surgical History:   Past Surgical History:   Procedure Laterality Date    BREAST BIOPSY Bilateral     DENTAL PROCEDURE  1942    MASTECTOMY  1979    RIGHT    OOPHORECTOMY Bilateral 1969    TOTAL ABDOMINAL HYSTERECTOMY  1969    TOTAL ABDOMINAL HYSTERECTOMY      TOTAL KNEE ARTHROPLASTY  2009    left    TOTAL KNEE ARTHROPLASTY  2011    right       Family History:   Family History   Problem Relation Age of Onset    Stroke Mother     Heart failure Father     Diabetes Daughter     Mental illness Daughter     Other (fluid retention) Daughter     Mental illness Son     Heart attack Maternal Aunt     Stroke Maternal Grandfather     Colon cancer Grandchild     Breast cancer Neg Hx     Ovarian cancer Neg Hx        Social History:   Social History     Socioeconomic History    Marital status:    Tobacco Use    Smoking status: Never    Smokeless tobacco: Never   Vaping Use    Vaping status: Never Used   Substance and Sexual Activity    Alcohol use: No    Drug use: No    Sexual activity: Defer       Medications:   Current Outpatient Medications:     alendronate (FOSAMAX) 70 MG tablet, Take 1 tablet by mouth Every 7 (Seven) Days., Disp: 4 tablet, Rfl: 5    clobetasol (TEMOVATE) 0.05 % ointment, APPLY A THIN LAYER TO LOWER LEGS TWICE DAILY FOR TWO WEEKS TAKE A WEEK BREAK THEN REPEAT, Disp: , Rfl:     folic acid (FOLVITE) 1 MG tablet, Take 1 tablet by mouth Daily., Disp: 90 tablet, Rfl: 1    meloxicam (MOBIC) 7.5 MG  "tablet, Take 1 tablet by mouth Daily As Needed for Mild Pain., Disp: 30 tablet, Rfl: 5    methotrexate 2.5 MG tablet, Take 8 tablets by mouth 1 (One) Time Per Week. SAME DAY EACH WK, Disp: 96 tablet, Rfl: 0    multivitamin with minerals tablet tablet, Take 1 tablet by mouth Daily., Disp: , Rfl:     omeprazole (priLOSEC) 20 MG capsule, Take 1 capsule by mouth Daily., Disp: , Rfl:     PROAIR RESPICLICK 108 (90 Base) MCG/ACT inhaler, As Needed., Disp: , Rfl: 0    QVAR REDIHALER 40 MCG/ACT inhaler, 2 (Two) Times a Day., Disp: , Rfl: 0    Vitamin D-Vitamin K (VITAMIN K2-VITAMIN D3 PO), Take 1 tablet by mouth Daily., Disp: , Rfl:     aspirin 81 MG EC tablet, Take 1 tablet by mouth Daily. (Patient not taking: Reported on 4/23/2025), Disp: , Rfl:     Allergies:   Allergies   Allergen Reactions    Penicillins Hives           Objective        Vital Signs:   Vitals:    04/23/25 1010   BP: 138/78   BP Location: Left arm   Patient Position: Sitting   Cuff Size: Adult   Pulse: 102   Temp: 97.6 °F (36.4 °C)   Weight: 59.6 kg (131 lb 8 oz)   Height: 157.5 cm (62\")   PainSc: 7      Body mass index is 24.05 kg/m².      Physical Exam:  Physical Exam   MUSCULOSKELETAL:   No peripheral synovitis  [place] bilaterally  CMC squaring  Tender elbows bilaterally, unable to extend elbows  Swelling of bilateral elbows  Bilateral knees have been replaced     Complete joint exam was performed including the MCPs, PIPs, DIPs of the hands, wrists, elbows, shoulders, hips, knees and ankles.  No soft tissue swelling or tenderness is present except as above.    General: Elderly.  Using walker.  Cooperative, alert and oriented. Affect is normal. Hydration appears normal.   HEENT: Normocephalic and atraumatic. Lids and conjunctiva are normal. Pupils are equal and sclera are clear. Oropharynx is clear   NECK neck is supple without adenopathy, masses or thyromegaly.   CARDIOVASCULAR: Regular rate and rhythm. No murmurs, rubs or gallops   LUNGS: Effort is " "normal. Lungs are clear bilateral   ABDOMEN: Not examined  EXTREMITIES: Peripheral pulses are intact. No clubbing.   SKIN: No rashes. No subcutaneous nodules. No digital ulcers. No sclerodactyly.   NEUROLOGIC: Gait is normal. Strength testing is normal.  No focal neurologic deficits    Results Review:   Labs:   Lab Results   Component Value Date    GLUCOSE 123 (H) 04/10/2025    BUN 9 04/10/2025    CREATININE 0.49 (L) 04/10/2025    EGFR 91.9 04/10/2025    BCR 18.4 04/10/2025    K 3.6 04/10/2025    CO2 21.9 (L) 04/10/2025    CALCIUM 8.8 04/10/2025    ALBUMIN 3.1 (L) 04/10/2025    BILITOT 0.5 04/10/2025    AST 25 04/10/2025    ALT 14 04/10/2025     Lab Results   Component Value Date    WBC 9.92 04/10/2025    HGB 12.0 04/10/2025    HCT 37.2 04/10/2025    MCV 91.2 04/10/2025     04/10/2025     Lab Results   Component Value Date    SEDRATE 80 (H) 09/09/2024     Lab Results   Component Value Date    CRP 1.76 (H) 04/09/2025     No results found for: \"QUANTIFERO\", \"QUANTITB1\", \"QUANTITB2\", \"QUANTIFERN\", \"QUANTIFERM\", \"QUANTITBGLDP\"  No results found for: \"RF\"  No results found for: \"HEPBSAG\", \"HEPAIGM\", \"HEPBIGMCORE\", \"HEPCVIRUSABY\"      Procedures    Assessment / Plan        - Rheumatoid arthritis involving multiple sites with positive rheumatoid factor  Assessment & Plan:  +, +CCP 98 (August 2020), elevated sed rate  9/2024: CCP > 250, RF 68.4  Primarily bilateral elbow involvement with destruction of right elbow  Previously saw Dr. Chambers 2020 [facility] rheumatology  **Current:  MTX restart 2/21(prior 2020 Dr Chambers), intermittent elbow steroid injection Dr Aldridge  Prior Plaquenil 9/20-7/23 (? itching/rash deep pigmentation shins), Celebrex (stopped by [facility] dermatology for ? drug rx causing itching legs/rash shins)  inflectra PA May 2021-she declined to start        Low disease activity from RA standpoint on methotrexate.  Swollen joint count 2.  Tender joint count 2.  She has been off her " methotrexate for many weeks as there was concern by her daughter that this might be contributing to her fatigue  Some worsening elbow pain without the methotrexate.  Her main complaint is chronic bilateral elbow pain which is from advanced degenerative osteoarthritis along with RA  She has debated on whether to have her elbow replaced with Dr. Aldridge, but is reluctant to proceed with surgery at her age.     Labs reviewed from her recent hospitalization which show stable findings  Restart methotrexate 20 mg once weekly which is well tolerated and effective.   - Continue meloxicam 7.5 mg once daily for OA pain     Lower extremity depigmentation/rash better with stopping hydroxychloroquine/celecoxib.  She still has itching that resolves with topical creams.  Dermatologist stopped Celebrex with the thought of possible drug reaction to the celecoxib.       She previously chose not to start infliximab/ Inflectra when offered in 7/2021  Labs reviewed and are stable  Continue labs every 8-12 weeks while on methotrexate. Order provided for labs today  Follow-up in 3 months.  Her daughter accompanies her today  Risks of NSAIDs discussed including GI upset, GI bleeding, renal or hepatic risks and the risk of cardiovascular disease and stroke.  Warned patient not to take other NSAIDs including over-the-counter NSAIDs    - Encounter for long-term (current) use of high-risk medication  - Immunosuppression due to medication  Assessment & Plan:  Methotrexate     Well tolerated and effective   We discussed risks of MTX. Risks include but are not limited to severe liver damage that can be fatal, the possible need for liver biopsy, bone marrow suppression that can lead to dangerously low blood counts, GI side effects including mouth sores and diarrhea, fatigue, and rare risk of severe pulmonary complications. There should be no alcohol consumed with MTX. MTX can cause severe fetal abnormalities whether the mother or father is taking  the medication and thus must be avoided if pregnancy is a possibility.  All medication is to be taken one day a week only. The need for q 8-12 week labs and the need for folic acid supplementation were discussed.       -Generalized osteoarthritis  Assessment & Plan:  Advanced OA right elbow; has seen orthopedic Dr. Aldridge  She is debated on having right elbow joint replacement but she is reluctant to proceed with surgery given her age    -Knee x-ray showed no loosening of hardware from joint replacements  - Continue PT.  She reports this is through home PT. She reports it is doing well.  -Recommend meloxicam as needed     It is okay for her to use the CBD cream.  I did discuss with the daughter and patient that CBD can show as THC in urine drug screen.     Previously prescribed gabapentin by an outside provider for leg pain that is suspected to be nerve related.     She stopped celecoxib as this was thought to perhaps cause itching/skin lesions lower extremities by her dermatologist at [facility].          - Status post bilateral knee replacements  Assessment & Plan:  2009 with Dr. Carlos.         -Osteoporosis   Assessment & Plan:  DEXA scan July 2022 with PCP-worsening osteoporosis  DEXA scan 7/6/20 with PCP:  T-score -2.3 left femoral neck (worse by 6%), T-score lumbar spine -2.3  Current:  Alendronate 2020   Prolia PA 8/22-never started due to patient concern about cost for 300 dollars per injection     Referred by her PCP for management of osteoporosis- worsening on latest bone density scan done with her PCP in July 2022 reviewed with patient  She admits she did not previously take Fosamax faithfully.  We started Prolia process back in 8/2022, but never started Prolia due to patient concern about cost of 300 dollars per injection     Recommend continue alendronate once weekly.  Refilled  Recommend continue calcium, vitamin-D, and weight-bearing exercise.  No recent falls  Update bone density scan when  we are back at [place].        1. Rheumatoid arthritis involving multiple sites with positive rheumatoid factor    2. Age-related osteoporosis without current pathological fracture        Assessment & Plan        No orders of the defined types were placed in this encounter.    New Medications Ordered This Visit   Medications    methotrexate 2.5 MG tablet     Sig: Take 8 tablets by mouth 1 (One) Time Per Week. SAME DAY EACH WK     Dispense:  96 tablet     Refill:  0    folic acid (FOLVITE) 1 MG tablet     Sig: Take 1 tablet by mouth Daily.     Dispense:  90 tablet     Refill:  1    meloxicam (MOBIC) 7.5 MG tablet     Sig: Take 1 tablet by mouth Daily As Needed for Mild Pain.     Dispense:  30 tablet     Refill:  5    alendronate (FOSAMAX) 70 MG tablet     Sig: Take 1 tablet by mouth Every 7 (Seven) Days.     Dispense:  4 tablet     Refill:  5       Follow Up:   Return in about 3 months (around 7/23/2025) for Followup APRN.      Discussed plan of care in detail with the patient today.  Patient verbalized understanding and agrees.    I confirm accuracy of unchanged data/findings which have been carried forward from previous visit.  I have updated appropriately those that have changed.        Satish Frost MD  [facility] [place]

## 2025-04-30 ENCOUNTER — OFFICE VISIT (OUTPATIENT)
Dept: CARDIOLOGY | Facility: CLINIC | Age: 85
End: 2025-04-30
Payer: MEDICARE

## 2025-04-30 VITALS
SYSTOLIC BLOOD PRESSURE: 116 MMHG | HEIGHT: 62 IN | DIASTOLIC BLOOD PRESSURE: 70 MMHG | BODY MASS INDEX: 24.05 KG/M2 | OXYGEN SATURATION: 95 % | HEART RATE: 78 BPM | WEIGHT: 130.7 LBS

## 2025-04-30 DIAGNOSIS — R01.1 HEART MURMUR: Chronic | ICD-10-CM

## 2025-04-30 DIAGNOSIS — I25.10 CORONARY ARTERY CALCIFICATION: Primary | Chronic | ICD-10-CM

## 2025-04-30 PROCEDURE — 99204 OFFICE O/P NEW MOD 45 MIN: CPT | Performed by: INTERNAL MEDICINE

## 2025-04-30 PROCEDURE — 93000 ELECTROCARDIOGRAM COMPLETE: CPT | Performed by: INTERNAL MEDICINE

## 2025-04-30 RX ORDER — NITROGLYCERIN 0.4 MG/1
TABLET SUBLINGUAL
Qty: 100 TABLET | Refills: 11 | Status: SHIPPED | OUTPATIENT
Start: 2025-04-30

## 2025-04-30 RX ORDER — ROSUVASTATIN CALCIUM 20 MG/1
20 TABLET, COATED ORAL DAILY
Qty: 30 TABLET | Refills: 11 | Status: SHIPPED | OUTPATIENT
Start: 2025-04-30

## 2025-04-30 NOTE — ASSESSMENT & PLAN NOTE
Mild coronary artery calcifications noted on her CT PE protocol.  This was personally reviewed and I agree that mild calcium is probably present but is obscured by intra-arterial contrast  Dobutamine stress echo was low risk.  Also personally reviewed and showed normal EF  ECG shows normal sinus rhythm with LVH with repolarization abnormality  Asymptomatic from this standpoint  No further testing indicated in the absence of symptoms  We will maximize medical therapy for secondary prevention of atherosclerosis.  Continue aspirin and add rosuvastatin 20  Provide her nitro and instructions on proper use.    Orders:    rosuvastatin (CRESTOR) 20 MG tablet; Take 1 tablet by mouth Daily.    nitroglycerin (NITROSTAT) 0.4 MG SL tablet; 1 under the tongue as needed for angina, may repeat q5mins for up three doses    ECG 12 Lead

## 2025-04-30 NOTE — PROGRESS NOTES
[facility] Cardiology     Outpatient New Patient / Consult Note    Katherine Marroquin  [id number]  2025    Referred By: Virginia Bell PA-C    Chief Complaint   Patient presents with    [facility]     New Patient    Chest Pain       Subjective     History of Present Illness:   Katherine Marroquin is a 84 y.o. female with Rheumatoid arthritis and hyperlipidemia presents as ER follow-up for chest pain.  Patient is hard of hearing and is accompanied by her daughter provides her medical history.  She apparently presented to the ER in early 2025 with substernal chest pain.  She underwent CT PE protocol which was negative and was kept overnight for a stress test.  She underwent dobutamine stress echo and was discharged home.  Patient is relatively sedentary and ambulates with a rolling walker.  She says she has had no more chest pain since her ER visit.  Denies any dyspnea.  No orthopnea, PND, or leg swelling    Past Cardiac History and Testin.  Coronary calcification  -- Mild CAC on CT PE protocol, 2025, cardiomegaly noted  2.  Aortic sclerosis/heart murmur  -- DSE/: Normal, no ECG/echo evidence of ischemia.  EF 65%.  Aortic valve calcified and sclerotic, possibly mildly stenotic.  3.  Hyperlipidemia  4.  Rheumatoid arthritis  5.  Right-sided breast cancer    Review of Systems:  Review of Systems   Constitutional: Negative.    Respiratory: Negative.     Cardiovascular: Negative.    Gastrointestinal: Negative.    Musculoskeletal: Negative.    Neurological: Negative.        Past Medical History:   Diagnosis Date    Anxiety     Breast cancer     RIGHT    Elevated lipids     Encounter for long-term (current) use of high-risk medication 2024    GERD (gastroesophageal reflux disease)     Hyperlipidemia     Insomnia     Obesity     Oral herpes     Osteoarthritis, generalized     Osteopenia     Osteoporosis     RA (rheumatoid arthritis)     Seropositive rheumatoid arthritis      Stomach ulcer        Past Surgical History:   Procedure Laterality Date    BREAST BIOPSY Bilateral     DENTAL PROCEDURE  1942    MASTECTOMY  1979    RIGHT    OOPHORECTOMY Bilateral 1969    TOTAL ABDOMINAL HYSTERECTOMY  1969    TOTAL ABDOMINAL HYSTERECTOMY      TOTAL KNEE ARTHROPLASTY  2009    left    TOTAL KNEE ARTHROPLASTY  2011    right       Family History   Problem Relation Age of Onset    Stroke Mother     Heart failure Father     Diabetes Daughter     Mental illness Daughter     Other (fluid retention) Daughter     Mental illness Son     Heart attack Maternal Aunt     Stroke Maternal Grandfather     Colon cancer Grandchild     Breast cancer Neg Hx     Ovarian cancer Neg Hx        Social History     Socioeconomic History    Marital status:    Tobacco Use    Smoking status: Never     Passive exposure: Never    Smokeless tobacco: Never   Vaping Use    Vaping status: Never Used   Substance and Sexual Activity    Alcohol use: No    Drug use: No    Sexual activity: Defer         Current Outpatient Medications:     alendronate (FOSAMAX) 70 MG tablet, Take 1 tablet by mouth Every 7 (Seven) Days., Disp: 4 tablet, Rfl: 5    aspirin 81 MG EC tablet, Take 1 tablet by mouth Daily., Disp: , Rfl:     clobetasol (TEMOVATE) 0.05 % ointment, APPLY A THIN LAYER TO LOWER LEGS TWICE DAILY FOR TWO WEEKS TAKE A WEEK BREAK THEN REPEAT, Disp: , Rfl:     folic acid (FOLVITE) 1 MG tablet, Take 1 tablet by mouth Daily., Disp: 90 tablet, Rfl: 1    meloxicam (MOBIC) 7.5 MG tablet, Take 1 tablet by mouth Daily As Needed for Mild Pain., Disp: 30 tablet, Rfl: 5    methotrexate 2.5 MG tablet, Take 8 tablets by mouth 1 (One) Time Per Week. SAME DAY EACH WK, Disp: 96 tablet, Rfl: 0    multivitamin with minerals tablet tablet, Take 1 tablet by mouth Daily., Disp: , Rfl:     omeprazole (priLOSEC) 20 MG capsule, Take 1 capsule by mouth As Needed., Disp: , Rfl:     PROAIR RESPICLICK 108 (90 Base) MCG/ACT inhaler, As Needed., Disp: , Rfl: 0     "QVAR REDIHALER 40 MCG/ACT inhaler, 2 (Two) Times a Day., Disp: , Rfl: 0    Vitamin D-Vitamin K (VITAMIN K2-VITAMIN D3 PO), Take 1 tablet by mouth Daily., Disp: , Rfl:     nitroglycerin (NITROSTAT) 0.4 MG SL tablet, 1 under the tongue as needed for angina, may repeat q5mins for up three doses, Disp: 100 tablet, Rfl: 11    rosuvastatin (CRESTOR) 20 MG tablet, Take 1 tablet by mouth Daily., Disp: 30 tablet, Rfl: 11    Allergies   Allergen Reactions    Penicillins Hives          Objective     Vitals:  Vitals:    04/30/25 1508   BP: 116/70   BP Location: Left arm   Patient Position: Sitting   Cuff Size: Adult   Pulse: 78   SpO2: 95%   Weight: 59.3 kg (130 lb 11.2 oz)   Height: 157.5 cm (62.01\")       Physical Exam:  Vitals reviewed.   Constitutional:       Appearance: Healthy appearance. Not in distress.   Neck:      Vascular: No JVD.   Pulmonary:      Effort: Pulmonary effort is normal.      Breath sounds: Normal breath sounds.   Cardiovascular:      Normal rate. Regular rhythm. Normal S1. Normal S2.       Murmurs: There is a grade 2/6 harsh midsystolic murmur at the URSB, radiating to the neck.      No gallop.  No click. No rub.   Pulses:     Intact distal pulses.   Edema:     Peripheral edema absent.   Abdominal:      General: There is no distension.      Palpations: Abdomen is soft.      Tenderness: There is no abdominal tenderness.   Skin:     General: Skin is warm and dry.         Results Review:   I reviewed the patient's new clinical results.  I reviewed the patient's new imaging results and agree with the interpretation.  I reviewed the patient's other test results and agree with the interpretation  I personally viewed and interpreted the patient's EKG/Telemetry data      ECG 12 Lead    Date/Time: 4/30/2025 3:43 PM  Performed by: Ismael Hodge MD    Authorized by: Ismael Hodge MD  Comparison: compared with previous ECG from 4/9/2025  Similar to previous ECG  Rhythm: sinus rhythm  Rate: normal  BPM: " 78  Conduction: conduction normal  QRS axis: normal  Other findings: left ventricular hypertrophy with strain    Clinical impression: abnormal EKG          Most Recent Labs:  High Sensitivity Troponin T 1Hr (04/09/2025 19:34)  CBC (No Diff) (04/10/2025 04:49)  Comprehensive Metabolic Panel (04/10/2025 04:49)  High Sensitivity Troponin T (04/10/2025 04:49)  T4, Free (04/09/2025 18:07)  TSH (04/09/2025 18:07)  D-dimer, Quantitative (04/09/2025 18:07)  BNP (04/09/2025 18:07)           Assessment & Plan  Coronary artery calcification  Mild coronary artery calcifications noted on her CT PE protocol.  This was personally reviewed and I agree that mild calcium is probably present but is obscured by intra-arterial contrast  Dobutamine stress echo was low risk.  Also personally reviewed and showed normal EF  ECG shows normal sinus rhythm with LVH with repolarization abnormality  Asymptomatic from this standpoint  No further testing indicated in the absence of symptoms  We will maximize medical therapy for secondary prevention of atherosclerosis.  Continue aspirin and add rosuvastatin 20  Provide her nitro and instructions on proper use.    Orders:    rosuvastatin (CRESTOR) 20 MG tablet; Take 1 tablet by mouth Daily.    nitroglycerin (NITROSTAT) 0.4 MG SL tablet; 1 under the tongue as needed for angina, may repeat q5mins for up three doses    ECG 12 Lead    Heart murmur  Does have systolic murmur on exam consistent with aortic stenosis.  Suspect mild AS.  Limited visualization of her aortic valve on stress echo shows that aortic valve is calcified and sclerotic.  Will obtain full echo for further investigation    Orders:    Adult Transthoracic Echo Complete W/ Cont if Necessary Per Protocol; Future           Plan   Preventative Cardiology:   Tobacco Cessation: N/A  Obstructive Sleep Apnea Screening: Deffered  AAA Screening: Deffered  Peripheral Arterial Disease Screening: Deffered    Advanced Care Planning  ACP discussion was  held with the patient during this visit. Patient does not have an advance directive, information provided.           Follow Up:   Return in about 6 months (around 10/30/2025).    Thank you for allowing me to participate in the care of your patient. Please to not hesitate to contact me with additional questions or concerns.     Electronically signed by Ismael Hodge MD, 04/30/25, 5:49 PM EDT.

## 2025-04-30 NOTE — ASSESSMENT & PLAN NOTE
Does have systolic murmur on exam consistent with aortic stenosis.  Suspect mild AS.  Limited visualization of her aortic valve on stress echo shows that aortic valve is calcified and sclerotic.  Will obtain full echo for further investigation    Orders:    Adult Transthoracic Echo Complete W/ Cont if Necessary Per Protocol; Future

## 2025-05-09 ENCOUNTER — HOSPITAL ENCOUNTER (OUTPATIENT)
Dept: CARDIOLOGY | Facility: HOSPITAL | Age: 85
Discharge: HOME | End: 2025-05-09
Payer: MEDICARE

## 2025-05-09 VITALS
WEIGHT: 130 LBS | BODY MASS INDEX: 23.92 KG/M2 | SYSTOLIC BLOOD PRESSURE: 139 MMHG | DIASTOLIC BLOOD PRESSURE: 84 MMHG | HEIGHT: 62 IN

## 2025-05-09 DIAGNOSIS — R01.1 HEART MURMUR: Chronic | ICD-10-CM

## 2025-05-09 LAB
AORTIC DIMENSIONLESS INDEX: 0.5 (DI)
AV MEAN PRESS GRAD SYS DOP V1V2: 9.1 MMHG
AV VMAX SYS DOP: 195 CM/SEC
BH CV ECHO MEAS - 2D AUTO EF SIEMENS: 57.4 %
BH CV ECHO MEAS - AO MAX PG: 15.2 MMHG
BH CV ECHO MEAS - AO ROOT DIAM: 2.2 CM
BH CV ECHO MEAS - AO V2 VTI: 41.8 CM
BH CV ECHO MEAS - AVA(I,D): 1.56 CM2
BH CV ECHO MEAS - IVS/LVPW: 1 CM
BH CV ECHO MEAS - IVSD: 0.9 CM
BH CV ECHO MEAS - LA DIMENSION: 3 CM
BH CV ECHO MEAS - LAT PEAK E' VEL: 11 CM/SEC
BH CV ECHO MEAS - LV MAX PG: 3.3 MMHG
BH CV ECHO MEAS - LV MEAN PG: 1.63 MMHG
BH CV ECHO MEAS - LV V1 MAX: 90.3 CM/SEC
BH CV ECHO MEAS - LV V1 VTI: 20.8 CM
BH CV ECHO MEAS - LVIDD: 3.5 CM
BH CV ECHO MEAS - LVIDS: 2.2 CM
BH CV ECHO MEAS - LVOT AREA: 3.1 CM2
BH CV ECHO MEAS - LVOT DIAM: 2 CM
BH CV ECHO MEAS - LVPWD: 0.9 CM
BH CV ECHO MEAS - MED PEAK E' VEL: 7 CM/SEC
BH CV ECHO MEAS - MV A MAX VEL: 45.5 CM/SEC
BH CV ECHO MEAS - MV DEC SLOPE: 300.2 CM/SEC2
BH CV ECHO MEAS - MV E MAX VEL: 58 CM/SEC
BH CV ECHO MEAS - MV E/A: 1.27
BH CV ECHO MEAS - MV MAX PG: 3.2 MMHG
BH CV ECHO MEAS - MV MEAN PG: 1.2 MMHG
BH CV ECHO MEAS - MV P1/2T: 75 MSEC
BH CV ECHO MEAS - MV V2 VTI: 26.3 CM
BH CV ECHO MEAS - MVA(P1/2T): 2.9 CM2
BH CV ECHO MEAS - MVA(VTI): 2.48 CM2
BH CV ECHO MEAS - PA ACC TIME: 0.16 SEC
BH CV ECHO MEAS - PA V2 MAX: 84.4 CM/SEC
BH CV ECHO MEAS - RAP SYSTOLE: 3 MMHG
BH CV ECHO MEAS - RVSP: 37 MMHG
BH CV ECHO MEAS - SV(LVOT): 65.2 ML
BH CV ECHO MEAS - SVI(LVOT): 41.1 ML/M2
BH CV ECHO MEAS - TAPSE (>1.6): 2.6 CM
BH CV ECHO MEAS - TR MAX PG: 34 MMHG
BH CV ECHO MEAS - TR MAX VEL: 291.1 CM/SEC
BH CV ECHO MEASUREMENTS AVERAGE E/E' RATIO: 6.44
BH CV VAS BP LEFT ARM: NORMAL MMHG
BH CV XLRA - RV BASE: 3.6 CM
BH CV XLRA - RV LENGTH: 7.5 CM
BH CV XLRA - RV MID: 2.5 CM
BH CV XLRA - TDI S': 19.4 CM/SEC
IVRT: 107 MS
LEFT ATRIUM VOLUME INDEX: 25.1 ML/M2

## 2025-05-09 PROCEDURE — 93306 TTE W/DOPPLER COMPLETE: CPT

## 2025-05-16 ENCOUNTER — RESULTS FOLLOW-UP (OUTPATIENT)
Dept: CARDIOLOGY | Facility: CLINIC | Age: 85
End: 2025-05-16
Payer: MEDICARE

## 2025-05-19 ENCOUNTER — HOSPITAL ENCOUNTER (OUTPATIENT)
Facility: HOSPITAL | Age: 85
Discharge: HOME | End: 2025-05-19
Payer: MEDICARE

## 2025-05-19 ENCOUNTER — TRANSCRIBE ORDERS (OUTPATIENT)
Dept: ADMINISTRATIVE | Facility: HOSPITAL | Age: 85
End: 2025-05-19
Payer: MEDICARE

## 2025-05-19 DIAGNOSIS — R92.8 ABNORMAL MAMMOGRAM: Primary | ICD-10-CM

## 2025-05-19 DIAGNOSIS — L03.122 ACUTE LYMPHANGITIS OF LEFT AXILLA: ICD-10-CM

## 2025-05-19 PROCEDURE — G0279 TOMOSYNTHESIS, MAMMO: HCPCS

## 2025-05-19 PROCEDURE — G0279 TOMOSYNTHESIS, MAMMO: HCPCS | Performed by: RADIOLOGY

## 2025-05-19 PROCEDURE — 76642 ULTRASOUND BREAST LIMITED: CPT

## 2025-05-19 PROCEDURE — 77065 DX MAMMO INCL CAD UNI: CPT

## 2025-05-19 PROCEDURE — 76642 ULTRASOUND BREAST LIMITED: CPT | Performed by: RADIOLOGY

## 2025-05-19 PROCEDURE — 77065 DX MAMMO INCL CAD UNI: CPT | Performed by: RADIOLOGY

## 2025-06-25 ENCOUNTER — HOSPITAL ENCOUNTER (OUTPATIENT)
Dept: ULTRASOUND IMAGING | Facility: HOSPITAL | Age: 85
Discharge: HOME | End: 2025-06-25
Payer: MEDICARE

## 2025-06-25 DIAGNOSIS — R92.8 ABNORMAL MAMMOGRAM: ICD-10-CM

## 2025-06-25 PROCEDURE — 25010000002 LIDOCAINE 1 % SOLUTION: Performed by: RADIOLOGY

## 2025-06-25 RX ORDER — LIDOCAINE HYDROCHLORIDE 10 MG/ML
5 INJECTION, SOLUTION INFILTRATION; PERINEURAL ONCE
Status: COMPLETED | OUTPATIENT
Start: 2025-06-25 | End: 2025-06-25

## 2025-06-25 RX ADMIN — Medication 5 ML: at 11:41

## 2025-06-26 ENCOUNTER — TELEPHONE (OUTPATIENT)
Dept: MAMMOGRAPHY | Facility: HOSPITAL | Age: 85
End: 2025-06-26
Payer: MEDICARE

## 2025-06-26 LAB — REF LAB TEST METHOD: NORMAL

## 2025-06-26 NOTE — TELEPHONE ENCOUNTER
Patient's daughter, and active  Pauline, notified of cytology and FLOW cytometry results and recommendation. Verbalizes understanding. Denies discomfort. Denies signs and symptoms of infection. Questions answered, support given, verbalized understanding. Daughter notified patient will receive a reminder to schedule screening mammogram approximately 2 months prior to when annual is due.

## 2025-07-23 ENCOUNTER — OFFICE VISIT (OUTPATIENT)
Age: 85
End: 2025-07-23
Payer: MEDICARE

## 2025-07-23 VITALS
SYSTOLIC BLOOD PRESSURE: 116 MMHG | HEART RATE: 80 BPM | RESPIRATION RATE: 18 BRPM | TEMPERATURE: 98.2 F | WEIGHT: 117 LBS | DIASTOLIC BLOOD PRESSURE: 74 MMHG | HEIGHT: 62 IN | BODY MASS INDEX: 21.53 KG/M2

## 2025-07-23 DIAGNOSIS — M05.79 RHEUMATOID ARTHRITIS INVOLVING MULTIPLE SITES WITH POSITIVE RHEUMATOID FACTOR: Primary | Chronic | ICD-10-CM

## 2025-07-23 DIAGNOSIS — Z78.0 POST-MENOPAUSAL: ICD-10-CM

## 2025-07-23 DIAGNOSIS — Z96.653 PRESENCE OF BOTH ARTIFICIAL KNEE JOINTS: ICD-10-CM

## 2025-07-23 DIAGNOSIS — Z79.899 ENCOUNTER FOR LONG-TERM (CURRENT) USE OF HIGH-RISK MEDICATION: ICD-10-CM

## 2025-07-23 DIAGNOSIS — M15.9 GENERALIZED OSTEOARTHRITIS: Chronic | ICD-10-CM

## 2025-07-23 DIAGNOSIS — M81.0 AGE-RELATED OSTEOPOROSIS WITHOUT CURRENT PATHOLOGICAL FRACTURE: Chronic | ICD-10-CM

## 2025-07-23 NOTE — PROGRESS NOTES
Office Follow Up      Date: 07/23/2025   Patient Name: Katherine Marroquin  MRN: [id number]  YOB: 1940    Referring Physician: No ref. provider found     Chief Complaint:   Chief Complaint   Patient presents with    Rheumatoid Arthritis     Follow up- patient reports a fall 6 months ago        History of Present Illness: Katherine Marroquin is a 85 y.o. female who is here today for follow up on rheumatoid arthritis.    History of Present Illness  She reports a general decline in her physical abilities compared to her previous state. She is not experiencing any joint swelling but mentions occasional ankle edema, which has since resolved. She is currently on a regimen of methotrexate, taking 8 pills weekly, and has recently refilled her prescriptions for meloxicam and folic acid. She had previously discontinued her MTX, but it was restarted at her last visit.     She also takes Fosamax. The pharmacy expressed concern about her concurrent use of meloxicam and methotrexate. She has been applying CBD cream to her knee and elbow and has found some diclofenac, which she plans to apply to her knee. She has previously been treated with prednisone and Celebrex.    She continues to experience discomfort in her elbow, although she is not currently under the care of an orthopedic specialist. She had previously consulted with Dr. Salter and Dr. Garcia.    She experienced a fall approximately 6 months ago, landing on her knee, which continues to cause her discomfort. An x-ray was performed at the time of the fall, revealing no abnormalities.    She occasionally experiences hallucinations, such as perceiving someone in her apartment when there is no one present. These episodes have been occurring for some time.      Subjective       Review of Systems: Review of Systems   Constitutional:  Positive for appetite change.   HENT:  Positive for hearing loss.    Eyes:  Positive for blurred vision.   Cardiovascular:   Positive for chest pain and leg swelling.   Gastrointestinal:  Positive for constipation and GERD.   Musculoskeletal:  Positive for arthralgias, neck pain and neck stiffness.   Skin:  Positive for dry skin.   Allergic/Immunologic: Positive for environmental allergies.   Neurological:  Positive for light-headedness.        Past Medical History:   Past Medical History:   Diagnosis Date    Anxiety     Breast cancer 1979    RIGHT    Elevated lipids     Encounter for long-term (current) use of high-risk medication 06/04/2024    GERD (gastroesophageal reflux disease)     Hyperlipidemia     Insomnia     Obesity     Oral herpes     Osteoarthritis, generalized     Osteopenia     Osteoporosis     RA (rheumatoid arthritis)     Seropositive rheumatoid arthritis     Stomach ulcer        Past Surgical History:   Past Surgical History:   Procedure Laterality Date    BREAST BIOPSY Bilateral     DENTAL PROCEDURE  1942    MASTECTOMY  1979    RIGHT    OOPHORECTOMY Bilateral 1969    TOTAL ABDOMINAL HYSTERECTOMY  1969    TOTAL ABDOMINAL HYSTERECTOMY      TOTAL KNEE ARTHROPLASTY  2009    left    TOTAL KNEE ARTHROPLASTY  2011    right       Family History:   Family History   Problem Relation Age of Onset    Stroke Mother     Heart failure Father     Diabetes Daughter     Mental illness Daughter     Other (fluid retention) Daughter     Mental illness Son     Heart attack Maternal Aunt     Stroke Maternal Grandfather     Colon cancer Grandchild     Breast cancer Neg Hx     Ovarian cancer Neg Hx        Social History:   Social History     Socioeconomic History    Marital status:    Tobacco Use    Smoking status: Never     Passive exposure: Never    Smokeless tobacco: Never   Vaping Use    Vaping status: Never Used   Substance and Sexual Activity    Alcohol use: No    Drug use: No    Sexual activity: Defer       Medications:   Current Outpatient Medications:     alendronate (FOSAMAX) 70 MG tablet, Take 1 tablet by mouth Every 7  "(Seven) Days., Disp: 4 tablet, Rfl: 5    aspirin 81 MG EC tablet, Take 1 tablet by mouth Daily., Disp: , Rfl:     clobetasol (TEMOVATE) 0.05 % ointment, APPLY A THIN LAYER TO LOWER LEGS TWICE DAILY FOR TWO WEEKS TAKE A WEEK BREAK THEN REPEAT, Disp: , Rfl:     folic acid (FOLVITE) 1 MG tablet, Take 1 tablet by mouth Daily., Disp: 90 tablet, Rfl: 1    meloxicam (MOBIC) 7.5 MG tablet, Take 1 tablet by mouth Daily As Needed for Mild Pain., Disp: 30 tablet, Rfl: 5    methotrexate 2.5 MG tablet, Take 8 tablets by mouth 1 (One) Time Per Week. SAME DAY EACH WK, Disp: 96 tablet, Rfl: 0    multivitamin with minerals tablet tablet, Take 1 tablet by mouth Daily., Disp: , Rfl:     omeprazole (priLOSEC) 20 MG capsule, Take 1 capsule by mouth As Needed., Disp: , Rfl:     PROAIR RESPICLICK 108 (90 Base) MCG/ACT inhaler, As Needed., Disp: , Rfl: 0    rosuvastatin (CRESTOR) 20 MG tablet, Take 1 tablet by mouth Daily., Disp: 30 tablet, Rfl: 11    Vitamin D-Vitamin K (VITAMIN K2-VITAMIN D3 PO), Take 1 tablet by mouth Daily., Disp: , Rfl:     Allergies:   Allergies   Allergen Reactions    Penicillins Hives     Objective      Vital Signs:   Vitals:    07/23/25 1503   BP: 116/74   BP Location: Left arm   Patient Position: Sitting   Cuff Size: Adult   Pulse: 80   Resp: 18   Temp: 98.2 °F (36.8 °C)   TempSrc: Infrared   Weight: 53.1 kg (117 lb)   Height: 157.5 cm (62.01\")   PainSc: 7      Body mass index is 21.39 kg/m².    Physical Exam:  Physical Exam   MUSCULOSKELETAL:   No peripheral synovitis  [place] bilaterally  CMC squaring  Tender elbows bilaterally, unable to extend elbows  Swelling of bilateral elbows  Bilateral knees have been replaced and have decreased ROM mild swelling left knee    Complete joint exam was performed including the MCPs, PIPs, DIPs of the hands, wrists, elbows, shoulders, hips, knees and ankles.  No soft tissue swelling or tenderness is present except as above.    General: Elderly.  In wheelchair.  Cooperative, " "alert and oriented. Affect is normal. Hydration appears normal.   HEENT: Normocephalic and atraumatic. Lids and conjunctiva are normal. Pupils are equal and sclera are clear. Oropharynx is clear   NECK neck is supple without adenopathy, masses or thyromegaly.   CARDIOVASCULAR: Regular rate and rhythm. No murmurs, rubs or gallops   LUNGS: Effort is normal. Lungs are clear bilateral   ABDOMEN: Not examined  EXTREMITIES: Peripheral pulses are intact. No clubbing.   SKIN: No rashes. No subcutaneous nodules. No digital ulcers. No sclerodactyly.   NEUROLOGIC: Gait is normal. Strength testing is normal.  No focal neurologic deficits    Results Review:   Labs:   Lab Results   Component Value Date    GLUCOSE 79 07/23/2025    BUN 13 07/23/2025    CREATININE 0.60 07/23/2025    EGFR 87 07/23/2025    BCR 22 07/23/2025    K 3.9 07/23/2025    CO2 20 07/23/2025    CALCIUM 9.2 07/23/2025    ALBUMIN 3.7 07/23/2025    BILITOT 0.3 07/23/2025    AST 14 07/23/2025    ALT 8 07/23/2025     Lab Results   Component Value Date    WBC 6.1 07/23/2025    HGB 12.6 07/23/2025    HCT 41.0 07/23/2025    MCV 95 07/23/2025     07/23/2025     Lab Results   Component Value Date    SEDRATE 67 (H) 07/23/2025     Lab Results   Component Value Date    CRP 1.76 (H) 04/09/2025     No results found for: \"QUANTIFERO\", \"QUANTITB1\", \"QUANTITB2\", \"QUANTIFERN\", \"QUANTIFERM\", \"QUANTITBGLDP\"  No results found for: \"RF\"  No results found for: \"HEPBSAG\", \"HEPAIGM\", \"HEPBIGMCORE\", \"HEPCVIRUSABY\"      Procedures    Assessment / Plan        - Rheumatoid arthritis involving multiple sites with positive rheumatoid factor  Assessment & Plan:  +, +CCP 98 (August 2020), elevated sed rate  9/2024: CCP > 250, RF 68.4  Primarily bilateral elbow involvement with destruction of right elbow  Previously saw Dr. Chambers 2020 [facility] rheumatology  **Current:  MTX restart 2/21(prior 2020 Dr Chambers), intermittent elbow steroid injection Dr Aldridge  Prior Plaquenil " 9/20-7/23 (? itching/rash deep pigmentation shins), Celebrex (stopped by [facility] dermatology for ? drug rx causing itching legs/rash shins)  inflectra PA May 2021-she declined to start     Low disease activity from RA standpoint on methotrexate.  Swollen joint count 0.  Tender joint count 2.    She has debated on whether to have her elbow replaced with Dr. Aldridge, but is reluctant to proceed with surgery at her age.     Continue methotrexate 20 mg once weekly which is well tolerated and effective.   - Continue meloxicam 7.5 mg once daily for OA pain     Lower extremity depigmentation/rash better with stopping hydroxychloroquine/celecoxib.  She still has itching that resolves with topical creams.  Dermatologist stopped Celebrex with the thought of possible drug reaction to the celecoxib.       She previously chose not to start infliximab/ Inflectra when offered in 7/2021  Labs reviewed and are stable  Continue labs every 8-12 weeks while on methotrexate. Order provided for labs today  She reported tenderness in her elbow. She is not currently seeing orthopedics for this issue. She was advised to use CBD cream and diclofenac on her elbow.  Follow-up in 3 months.    Her daughter accompanies her today  Risks of NSAIDs discussed including GI upset, GI bleeding, renal or hepatic risks and the risk of cardiovascular disease and stroke.  Warned patient not to take other NSAIDs including over-the-counter NSAIDs    - Encounter for long-term (current) use of high-risk medication  - Immunosuppression due to medication  Assessment & Plan:  Methotrexate     Well tolerated and effective   We discussed risks of MTX. Risks include but are not limited to severe liver damage that can be fatal, the possible need for liver biopsy, bone marrow suppression that can lead to dangerously low blood counts, GI side effects including mouth sores and diarrhea, fatigue, and rare risk of severe pulmonary complications. There should be no  alcohol consumed with MTX. MTX can cause severe fetal abnormalities whether the mother or father is taking the medication and thus must be avoided if pregnancy is a possibility.  All medication is to be taken one day a week only. The need for q 8-12 week labs and the need for folic acid supplementation were discussed.       -Generalized osteoarthritis  Assessment & Plan:  Advanced OA right elbow; has seen orthopedic Dr. Aldridge  She is debated on having right elbow joint replacement but she is reluctant to proceed with surgery given her age    -Knee x-ray showed no loosening of hardware from joint replacements  - Continue PT.  She reports this is through home PT. She reports it is doing well.  -Recommend meloxicam as needed     It is okay for her to use the CBD cream.  I did discuss with the daughter and patient that CBD can show as THC in urine drug screen.     Previously prescribed gabapentin by an outside provider for leg pain that is suspected to be nerve related.     She stopped celecoxib as this was thought to perhaps cause itching/skin lesions lower extremities by her dermatologist at [facility].          - Status post bilateral knee replacements  Assessment & Plan:  2009 with Dr. Carlos.     She reported knee pain following a fall about 6 months ago. An x-ray at that time did not reveal any issues. She was advised to use CBD cream and diclofenac on her knee.        -Osteoporosis   Assessment & Plan:  DEXA scan July 2022 with PCP-worsening osteoporosis  DEXA scan 7/6/20 with PCP:  T-score -2.3 left femoral neck (worse by 6%), T-score lumbar spine -2.3  Current:  Alendronate 2020   Prolia PA 8/22-never started due to patient concern about cost for 300 dollars per injection     Referred by her PCP for management of osteoporosis- worsening on latest bone density scan done with her PCP in July 2022 reviewed with patient  She admits she did not previously take Fosamax faithfully.  We started Prolia  process back in 8/2022, but never started Prolia due to patient concern about cost of 300 dollars per injection     Recommend continue alendronate once weekly.  Refilled  Recommend continue calcium, vitamin-D, and weight-bearing exercise.  No recent falls    A bone density scan (DEXA) will be scheduled before her next appointment due to her long-term use of Fosamax.      Assessment & Plan      Patient or patient representative verbalized consent for the use of Ambient Listening during the visit with  MARKIE Townsend for chart documentation.  7/23/2025 15:15 EDT    1. Rheumatoid arthritis involving multiple sites with positive rheumatoid factor    2. Generalized osteoarthritis    3. Age-related osteoporosis without current pathological fracture    4. Encounter for long-term (current) use of high-risk medication    5. Presence of both artificial knee joints    6. Post-menopausal      Orders Placed This Encounter   Procedures    DEXA Bone Density Axial    Comprehensive Metabolic Panel    C-reactive Protein    Sedimentation Rate    CBC (No Diff)        Assessment & Plan        Orders Placed This Encounter   Procedures    DEXA Bone Density Axial    Comprehensive Metabolic Panel    C-reactive Protein    Sedimentation Rate    CBC (No Diff)     No orders of the defined types were placed in this encounter.    I attest that the documentation was copied from a previous note but is still current and accurate.    Follow Up:   Return in about 3 months (around 10/23/2025) for NP's.      MARKIE Townsend  [facility] [place] [facility] [place]

## 2025-07-24 ENCOUNTER — RESULTS FOLLOW-UP (OUTPATIENT)
Age: 85
End: 2025-07-24
Payer: MEDICARE

## 2025-07-24 LAB
ALBUMIN SERPL-MCNC: 3.7 G/DL (ref 3.7–4.7)
ALP SERPL-CCNC: 91 IU/L (ref 44–121)
ALT SERPL-CCNC: 8 IU/L (ref 0–32)
AST SERPL-CCNC: 14 IU/L (ref 0–40)
BILIRUB SERPL-MCNC: 0.3 MG/DL (ref 0–1.2)
BUN SERPL-MCNC: 13 MG/DL (ref 8–27)
BUN/CREAT SERPL: 22 (ref 12–28)
CALCIUM SERPL-MCNC: 9.2 MG/DL (ref 8.7–10.3)
CHLORIDE SERPL-SCNC: 102 MMOL/L (ref 96–106)
CO2 SERPL-SCNC: 20 MMOL/L (ref 20–29)
CREAT SERPL-MCNC: 0.6 MG/DL (ref 0.57–1)
CRP SERPL-MCNC: 15 MG/L (ref 0–10)
EGFRCR SERPLBLD CKD-EPI 2021: 87 ML/MIN/1.73
ERYTHROCYTE [DISTWIDTH] IN BLOOD BY AUTOMATED COUNT: 13.7 % (ref 11.7–15.4)
ERYTHROCYTE [SEDIMENTATION RATE] IN BLOOD BY WESTERGREN METHOD: 67 MM/HR (ref 0–40)
GLOBULIN SER CALC-MCNC: 3.9 G/DL (ref 1.5–4.5)
GLUCOSE SERPL-MCNC: 79 MG/DL (ref 70–99)
HCT VFR BLD AUTO: 41 % (ref 34–46.6)
HGB BLD-MCNC: 12.6 G/DL (ref 11.1–15.9)
MCH RBC QN AUTO: 29.3 PG (ref 26.6–33)
MCHC RBC AUTO-ENTMCNC: 30.7 G/DL (ref 31.5–35.7)
MCV RBC AUTO: 95 FL (ref 79–97)
PLATELET # BLD AUTO: 270 X10E3/UL (ref 150–450)
POTASSIUM SERPL-SCNC: 3.9 MMOL/L (ref 3.5–5.2)
PROT SERPL-MCNC: 7.6 G/DL (ref 6–8.5)
RBC # BLD AUTO: 4.3 X10E6/UL (ref 3.77–5.28)
SODIUM SERPL-SCNC: 137 MMOL/L (ref 134–144)
WBC # BLD AUTO: 6.1 X10E3/UL (ref 3.4–10.8)